# Patient Record
Sex: MALE | Race: BLACK OR AFRICAN AMERICAN | NOT HISPANIC OR LATINO | Employment: FULL TIME | URBAN - METROPOLITAN AREA
[De-identification: names, ages, dates, MRNs, and addresses within clinical notes are randomized per-mention and may not be internally consistent; named-entity substitution may affect disease eponyms.]

---

## 2017-11-09 ENCOUNTER — HOSPITAL ENCOUNTER (EMERGENCY)
Facility: HOSPITAL | Age: 42
Discharge: HOME | End: 2017-11-09
Attending: EMERGENCY MEDICINE
Payer: MEDICAID

## 2017-11-09 VITALS
BODY MASS INDEX: 44.56 KG/M2 | TEMPERATURE: 99 F | OXYGEN SATURATION: 97 % | SYSTOLIC BLOOD PRESSURE: 159 MMHG | RESPIRATION RATE: 16 BRPM | HEART RATE: 87 BPM | DIASTOLIC BLOOD PRESSURE: 100 MMHG | WEIGHT: 294 LBS | HEIGHT: 68 IN

## 2017-11-09 DIAGNOSIS — G51.0 RIGHT-SIDED BELL'S PALSY: Primary | ICD-10-CM

## 2017-11-09 PROCEDURE — 99283 EMERGENCY DEPT VISIT LOW MDM: CPT

## 2017-11-09 RX ORDER — VALACYCLOVIR HYDROCHLORIDE 1 G/1
1000 TABLET, FILM COATED ORAL 3 TIMES DAILY
Qty: 21 TABLET | Refills: 0 | Status: SHIPPED | OUTPATIENT
Start: 2017-11-09 | End: 2017-11-16

## 2017-11-09 RX ORDER — PREDNISONE 20 MG/1
60 TABLET ORAL DAILY
Qty: 15 TABLET | Refills: 0 | Status: SHIPPED | OUTPATIENT
Start: 2017-11-09 | End: 2017-11-14

## 2017-11-09 NOTE — ED PROVIDER NOTES
"Encounter Date: 11/9/2017    SCRIBE #1 NOTE: I, Key Voss , am scribing for, and in the presence of,  Dr. Alejandro . I have scribed the entire note.       History     Chief Complaint   Patient presents with    Numbness     right side of face with difficulty closing right eye and difficulty opening mouth     11/09/2017  1:02 AM     Chief Complaint: R sided facial weakness       The patient is a 41 y.o. male who is presenting with R sided facial numbness/weakness that began x 4 hours PTA. The pt reports that he was watching TV when he suddenly felt the R side of his face become "weak" when compared to the L. He endorses mild numbness to the R side his face as well. No exacerbating or mitigating factors. No other associated sx including loss of vision, extremity weakness/numbness, dysphagia, or slurred speech. No previous episodes of similar sx. PMHx includes obesity and HTN. No known past surgical hx.             The history is provided by the patient and medical records.     Review of patient's allergies indicates:   Allergen Reactions    Codeine      Past Medical History:   Diagnosis Date    Asthma     Hypertension     Obesity      History reviewed. No pertinent surgical history.  History reviewed. No pertinent family history.  Social History   Substance Use Topics    Smoking status: Never Smoker    Smokeless tobacco: Not on file    Alcohol use No     Review of Systems   Constitutional: Negative for fever.   HENT: Negative for sore throat.    Respiratory: Negative for shortness of breath.    Cardiovascular: Negative for chest pain.   Gastrointestinal: Negative for nausea.   Genitourinary: Negative for dysuria.   Musculoskeletal: Negative for back pain.   Skin: Negative for rash.   Neurological: Positive for facial asymmetry, weakness and numbness.   Hematological: Does not bruise/bleed easily.       Physical Exam     Initial Vitals [11/09/17 0031]   BP Pulse Resp Temp SpO2   (!) 196/107 95 16 98.5 °F (36.9 " °C) 99 %      MAP       136.67         Physical Exam    Nursing note and vitals reviewed.  Constitutional: He appears well-developed and well-nourished.  Non-toxic appearance. No distress.   HENT:   Head: Normocephalic and atraumatic.   Eyes: EOM are normal. Pupils are equal, round, and reactive to light.   Neck: Normal range of motion. Neck supple. No neck rigidity. No JVD present.   Cardiovascular: Normal rate, regular rhythm, normal heart sounds and intact distal pulses.   Abdominal: Soft. Bowel sounds are normal. He exhibits no distension. There is no tenderness. There is no rigidity, no rebound and no guarding.   Musculoskeletal: Normal range of motion.   Neurological: He is alert and oriented to person, place, and time. He has normal strength and normal reflexes. A cranial nerve deficit is present. No sensory deficit. He exhibits normal muscle tone. Coordination normal. GCS eye subscore is 4. GCS verbal subscore is 5. GCS motor subscore is 6.   R sided facial weakness present that includes the forehead. No slurred speech.    Skin: Skin is warm and dry.   Psychiatric: He has a normal mood and affect. His speech is normal and behavior is normal. He is not actively hallucinating.         ED Course   Procedures  Labs Reviewed - No data to display          Medical Decision Making:   History:   Old Medical Records: I decided to obtain old medical records.  Initial Assessment:   This is an emergent evaluation for left facial assymetry  My overall impression is Bell's palsy.  I do not think the patient has CVA, Lyme disease, Luis-Hunt Syndrome, acoustic neuroma, meningitis, Guillan Curlew, Basilar artery aneurysm- pt has no other cranial nerve deficits.  Decision to obtain old record and review if available.    Pt has unilateral lower and upper facial weakness that involves the forehead.  I will prescribe Lacrilube to avoid corneal damage and instruct eye patching at night. I will also prescribe oral steroids if the  symptoms have been recent in onset.    The patient express understanding of this and understands they can come back to the emergency if their condition get worse before they see their primary care doctor.   The patient discharged in NAD.     Houston Alejandro MD                Scribe Attestation:   Scribe #1: I performed the above scribed service and the documentation accurately describes the services I performed. I attest to the accuracy of the note.    I, Javon Lau, personally performed the services described in this documentation. All medical record entries made by the scribe were at my direction and in my presence.  I have reviewed the chart and agree that the record reflects my personal performance and is accurate and complete. Houston Alejandro MD.  11:42 PM 11/12/2017          ED Course      Clinical Impression:   The encounter diagnosis was Right-sided Bell's palsy.                           Houston Alejandro MD  11/12/17 8048

## 2021-01-02 ENCOUNTER — HOSPITAL ENCOUNTER (EMERGENCY)
Facility: HOSPITAL | Age: 46
Discharge: HOME | End: 2021-01-02
Attending: EMERGENCY MEDICINE
Payer: MEDICAID

## 2021-01-02 VITALS
TEMPERATURE: 100 F | DIASTOLIC BLOOD PRESSURE: 90 MMHG | WEIGHT: 280 LBS | SYSTOLIC BLOOD PRESSURE: 136 MMHG | HEART RATE: 112 BPM | OXYGEN SATURATION: 95 % | BODY MASS INDEX: 42.44 KG/M2 | RESPIRATION RATE: 20 BRPM | HEIGHT: 68 IN

## 2021-01-02 DIAGNOSIS — U07.1 COVID-19 VIRUS DETECTED: Primary | ICD-10-CM

## 2021-01-02 DIAGNOSIS — U07.1 COVID-19 VIRUS DETECTED: ICD-10-CM

## 2021-01-02 DIAGNOSIS — R05.9 COUGH: ICD-10-CM

## 2021-01-02 LAB — SARS-COV-2 RDRP RESP QL NAA+PROBE: POSITIVE

## 2021-01-02 PROCEDURE — 99283 EMERGENCY DEPT VISIT LOW MDM: CPT | Mod: 25

## 2021-01-02 PROCEDURE — U0002 COVID-19 LAB TEST NON-CDC: HCPCS

## 2021-01-04 ENCOUNTER — NURSE TRIAGE (OUTPATIENT)
Dept: ADMINISTRATIVE | Facility: CLINIC | Age: 46
End: 2021-01-04

## 2021-01-08 ENCOUNTER — NURSE TRIAGE (OUTPATIENT)
Dept: ADMINISTRATIVE | Facility: CLINIC | Age: 46
End: 2021-01-08

## 2021-12-30 ENCOUNTER — LAB VISIT (OUTPATIENT)
Dept: PRIMARY CARE CLINIC | Facility: OTHER | Age: 46
End: 2021-12-30
Attending: INTERNAL MEDICINE
Payer: MEDICAID

## 2021-12-30 DIAGNOSIS — Z20.822 ENCOUNTER FOR LABORATORY TESTING FOR COVID-19 VIRUS: ICD-10-CM

## 2021-12-30 PROCEDURE — U0003 INFECTIOUS AGENT DETECTION BY NUCLEIC ACID (DNA OR RNA); SEVERE ACUTE RESPIRATORY SYNDROME CORONAVIRUS 2 (SARS-COV-2) (CORONAVIRUS DISEASE [COVID-19]), AMPLIFIED PROBE TECHNIQUE, MAKING USE OF HIGH THROUGHPUT TECHNOLOGIES AS DESCRIBED BY CMS-2020-01-R: HCPCS | Performed by: INTERNAL MEDICINE

## 2022-01-01 LAB
SARS-COV-2 RNA RESP QL NAA+PROBE: DETECTED
SARS-COV-2- CYCLE NUMBER: 28

## 2022-01-03 DIAGNOSIS — U07.1 COVID-19 VIRUS DETECTED: ICD-10-CM

## 2022-06-19 ENCOUNTER — HOSPITAL ENCOUNTER (EMERGENCY)
Facility: HOSPITAL | Age: 47
Discharge: HOME | End: 2022-06-19
Attending: EMERGENCY MEDICINE
Payer: MEDICAID

## 2022-06-19 VITALS
OXYGEN SATURATION: 96 % | HEART RATE: 76 BPM | TEMPERATURE: 98 F | RESPIRATION RATE: 17 BRPM | HEIGHT: 68 IN | WEIGHT: 273 LBS | DIASTOLIC BLOOD PRESSURE: 76 MMHG | SYSTOLIC BLOOD PRESSURE: 135 MMHG | BODY MASS INDEX: 41.37 KG/M2

## 2022-06-19 DIAGNOSIS — T24.209A PARTIAL THICKNESS BURN OF LOWER EXTREMITY, UNSPECIFIED LATERALITY, INITIAL ENCOUNTER: Primary | ICD-10-CM

## 2022-06-19 DIAGNOSIS — T30.0 BURN: ICD-10-CM

## 2022-06-19 PROCEDURE — 96360 HYDRATION IV INFUSION INIT: CPT | Mod: 59

## 2022-06-19 PROCEDURE — 63600175 PHARM REV CODE 636 W HCPCS: Performed by: EMERGENCY MEDICINE

## 2022-06-19 PROCEDURE — 90715 TDAP VACCINE 7 YRS/> IM: CPT | Performed by: EMERGENCY MEDICINE

## 2022-06-19 PROCEDURE — 16020 DRESS/DEBRID P-THICK BURN S: CPT

## 2022-06-19 PROCEDURE — 25000003 PHARM REV CODE 250: Performed by: EMERGENCY MEDICINE

## 2022-06-19 PROCEDURE — 96361 HYDRATE IV INFUSION ADD-ON: CPT | Mod: 59

## 2022-06-19 PROCEDURE — 90471 IMMUNIZATION ADMIN: CPT | Performed by: EMERGENCY MEDICINE

## 2022-06-19 PROCEDURE — 99284 EMERGENCY DEPT VISIT MOD MDM: CPT | Mod: 25

## 2022-06-19 RX ORDER — BACITRACIN ZINC 500 [USP'U]/G
OINTMENT TOPICAL
Status: COMPLETED | OUTPATIENT
Start: 2022-06-19 | End: 2022-06-19

## 2022-06-19 RX ADMIN — SODIUM CHLORIDE 1000 ML: 0.9 INJECTION, SOLUTION INTRAVENOUS at 02:06

## 2022-06-19 RX ADMIN — TETANUS TOXOID, REDUCED DIPHTHERIA TOXOID AND ACELLULAR PERTUSSIS VACCINE, ADSORBED 0.5 ML: 5; 2.5; 8; 8; 2.5 SUSPENSION INTRAMUSCULAR at 04:06

## 2022-06-19 RX ADMIN — BACITRACIN ZINC: 500 OINTMENT TOPICAL at 03:06
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
## 2023-07-20 ENCOUNTER — PATIENT MESSAGE (OUTPATIENT)
Dept: GASTROENTEROLOGY | Facility: CLINIC | Age: 48
End: 2023-07-20
Payer: MEDICAID

## 2023-07-20 ENCOUNTER — TELEPHONE (OUTPATIENT)
Dept: GASTROENTEROLOGY | Facility: CLINIC | Age: 48
End: 2023-07-20
Payer: MEDICAID

## 2023-07-20 DIAGNOSIS — Z12.11 SCREENING FOR COLON CANCER: Primary | ICD-10-CM

## 2023-07-20 NOTE — TELEPHONE ENCOUNTER
----- Message from Jolene Esparza LPN sent at 7/20/2023  3:02 PM CDT -----    ----- Message -----  From: Jolene Carcamo  Sent: 7/20/2023   3:00 PM CDT  To: [facility] Clinical Staff      ----- Message -----  From: Rayne Banda LPN  Sent: 7/20/2023   2:55 PM CDT  To: Jolene Carcamo    Wrong dept.  This is Endocrinology.   ----- Message -----  From: Jolene Carcamo  Sent: 7/20/2023   2:53 PM CDT  To: [facility] End[facility] Clinical Staff    Hello,    Pts Gastro referral for a colon screening has been scanned into media mgr.    Thanks      Jolene PINK

## 2023-07-20 NOTE — TELEPHONE ENCOUNTER
Call placed to Jhoana Gill in regards to referral for screening colonoscopy. Pt denies any GI issues at this time. Procedure set for 10/20 at 0800 arriving for 0700. Prep instructions reviewed and placed in mail per pt request.     Address in chart corrected to new address. No further issues noted.

## 2023-09-09 ENCOUNTER — HOSPITAL ENCOUNTER (EMERGENCY)
Facility: HOSPITAL | Age: 48
Discharge: HOME | End: 2023-09-09
Attending: EMERGENCY MEDICINE
Payer: MEDICAID

## 2023-09-09 VITALS
OXYGEN SATURATION: 96 % | HEIGHT: 68 IN | SYSTOLIC BLOOD PRESSURE: 130 MMHG | HEART RATE: 78 BPM | TEMPERATURE: 98 F | RESPIRATION RATE: 18 BRPM | BODY MASS INDEX: 43.1 KG/M2 | WEIGHT: 284.38 LBS | DIASTOLIC BLOOD PRESSURE: 78 MMHG

## 2023-09-09 DIAGNOSIS — Z20.822 CLOSE EXPOSURE TO COVID-19 VIRUS: ICD-10-CM

## 2023-09-09 DIAGNOSIS — R05.9 COUGH: ICD-10-CM

## 2023-09-09 DIAGNOSIS — B34.9 VIRAL SYNDROME: Primary | ICD-10-CM

## 2023-09-09 LAB
INFLUENZA A, MOLECULAR: NEGATIVE
INFLUENZA B, MOLECULAR: NEGATIVE
SARS-COV-2 RDRP RESP QL NAA+PROBE: NEGATIVE
SPECIMEN SOURCE: NORMAL

## 2023-09-09 PROCEDURE — U0002 COVID-19 LAB TEST NON-CDC: HCPCS | Performed by: EMERGENCY MEDICINE

## 2023-09-09 PROCEDURE — 87502 INFLUENZA DNA AMP PROBE: CPT | Performed by: EMERGENCY MEDICINE

## 2023-09-09 PROCEDURE — 99283 EMERGENCY DEPT VISIT LOW MDM: CPT | Mod: 25

## 2023-09-09 RX ORDER — BENZONATATE 100 MG/1
100 CAPSULE ORAL 3 TIMES DAILY PRN
Qty: 30 CAPSULE | Refills: 0 | Status: SHIPPED | OUTPATIENT
Start: 2023-09-09 | End: 2023-09-19

## 2023-09-09 NOTE — Clinical Note
"Janae Lambert" Jairo was seen and treated in our emergency department on 9/9/2023.  He may return to work on 09/14/2023.       If you have any questions or concerns, please don't hesitate to call.      EDWINA Jose RN    "

## 2023-09-09 NOTE — ED PROVIDER NOTES
Encounter Date: 9/9/2023       History     Chief Complaint   Patient presents with    Cough     Cough, fever, upper airway congestion for the last week.    Chest Pain     Chest pain worse with cough.  Wheezing noted to upper and lower left lung fields.     HPI 47-year-old man presents emergency department for evaluation cough, fever, congestion since last week.  He has chest tightness/pain with coughing.  His wife is sick with similar symptoms and tested positive for COVID.  Review of patient's allergies indicates:   Allergen Reactions    Codeine      Past Medical History:   Diagnosis Date    Asthma     Diabetes mellitus     Hypertension     Obesity      No past surgical history on file.  No family history on file.  Social History     Tobacco Use    Smoking status: Never   Substance Use Topics    Alcohol use: No    Drug use: No     Review of Systems   Constitutional:  Positive for fever.   HENT:  Positive for congestion.    Respiratory:  Positive for cough.    Cardiovascular:  Positive for chest pain.       Physical Exam     Initial Vitals [09/09/23 0152]   BP Pulse Resp Temp SpO2   130/78 78 18 98 °F (36.7 °C) 96 %      MAP       --         Physical Exam    Nursing note and vitals reviewed.  Constitutional: He appears well-developed and well-nourished.   HENT:   Head: Normocephalic and atraumatic.   Mouth/Throat: Oropharynx is clear and moist.   Eyes: EOM are normal. Pupils are equal, round, and reactive to light.   Neck: Neck supple.   Cardiovascular:  Normal rate and regular rhythm.           Pulmonary/Chest: No respiratory distress. He has no wheezes.   Musculoskeletal:         General: Normal range of motion.      Cervical back: Neck supple.     Neurological: He is alert and oriented to person, place, and time.   Skin: Skin is warm and dry. Capillary refill takes less than 2 seconds.         ED Course   Procedures  Labs Reviewed   INFLUENZA A & B BY MOLECULAR   SARS-COV-2 RNA AMPLIFICATION, QUAL           Imaging Results              X-Ray Chest AP Portable (Preliminary result)  Result time 09/09/23 02:49:54      Wet Read by Houston Alejandro MD (09/09/23 02:49:54, [facility], Emergency Medicine)    No acute change compared to previous chest x-ray.                                     Medications - No data to display  Medical Decision Making  The patient appears to have a viral upper respiratory infection.  Wife tested positive for COVID, patient likely has COVID however test is negative.  Based upon the history and physical exam the patient does not appear to have a serious bacterial infection such as pneumonia, sepsis, otitis media, bacterial sinusitis, strep pharyngitis, parapharyngeal or peritonsillar abscess, meningitis.  Patient appears very well and I have given specific return precautions to the patient and/or family members.  The patient can take over the counter medications and does not appear to need antibiotics at this time.         Amount and/or Complexity of Data Reviewed  Radiology: ordered and independent interpretation performed.    Risk  Prescription drug management.                               Clinical Impression:   Final diagnoses:  [R05.9] Cough  [B34.9] Viral syndrome (Primary)  [Z20.822] Close exposure to COVID-19 virus        ED Disposition Condition    Discharge Stable          ED Prescriptions       Medication Sig Dispense Start Date End Date Auth. Provider    benzonatate (TESSALON) 100 MG capsule Take 1 capsule (100 mg total) by mouth 3 (three) times daily as needed for Cough. 30 capsule 9/9/2023 9/19/2023 Houston Alejandro MD          Follow-up Information       Follow up With Specialties Details Why Contact Info Additional Information    [facility] Emergency Medicine  As needed, If symptoms worsen [address] [place] [address] [address] [place] [address] 1st floor             Houston Alejandro MD  09/09/23 0691

## 2024-03-21 ENCOUNTER — HOSPITAL ENCOUNTER (EMERGENCY)
Facility: HOSPITAL | Age: 49
Discharge: HOME | End: 2024-03-21
Attending: EMERGENCY MEDICINE
Payer: MEDICAID

## 2024-03-21 VITALS
DIASTOLIC BLOOD PRESSURE: 88 MMHG | HEART RATE: 74 BPM | TEMPERATURE: 98 F | RESPIRATION RATE: 14 BRPM | WEIGHT: 240 LBS | OXYGEN SATURATION: 97 % | SYSTOLIC BLOOD PRESSURE: 148 MMHG | BODY MASS INDEX: 36.49 KG/M2

## 2024-03-21 DIAGNOSIS — S70.02XA CONTUSION OF LEFT HIP, INITIAL ENCOUNTER: ICD-10-CM

## 2024-03-21 DIAGNOSIS — R52 PAIN: ICD-10-CM

## 2024-03-21 DIAGNOSIS — S63.502A SPRAIN OF LEFT WRIST, INITIAL ENCOUNTER: Primary | ICD-10-CM

## 2024-03-21 PROCEDURE — 99284 EMERGENCY DEPT VISIT MOD MDM: CPT | Mod: 25

## 2024-03-21 PROCEDURE — 29125 APPL SHORT ARM SPLINT STATIC: CPT | Mod: LT

## 2024-03-21 RX ORDER — EMPAGLIFLOZIN 25 MG/1
25 TABLET, FILM COATED ORAL
COMMUNITY
Start: 2023-10-31

## 2024-03-21 RX ORDER — CHLORTHALIDONE 50 MG/1
50 TABLET ORAL
COMMUNITY
Start: 2023-12-07

## 2024-03-21 RX ORDER — BUDESONIDE AND FORMOTEROL FUMARATE DIHYDRATE 80; 4.5 UG/1; UG/1
AEROSOL RESPIRATORY (INHALATION)
COMMUNITY
Start: 2023-02-17

## 2024-03-21 RX ORDER — GABAPENTIN 250 MG/5ML
250 SOLUTION ORAL
COMMUNITY

## 2024-03-21 RX ORDER — ATORVASTATIN CALCIUM 80 MG/1
1 TABLET, FILM COATED ORAL DAILY
COMMUNITY
Start: 2023-11-07 | End: 2024-11-06

## 2024-03-21 RX ORDER — AMLODIPINE BESYLATE 10 MG/1
1 TABLET ORAL DAILY
COMMUNITY

## 2024-03-22 NOTE — ED PROVIDER NOTES
Encounter Date: 3/21/2024       History     Chief Complaint   Patient presents with    Fall     Slipped and fell on wet floor at gas station.  Having left wrist- left hip pain     Patient is a 48 y.o. male who presents to the ED 03/21/2024 with a chief complaint of Left wrist pain and numbness in his fingers and left hip pain after falling at the gas station at 2:00 a.m..  Patient states he slipped on a wet floor and landed on his left side.  He states he did not hit his head or lose consciousness.  He states his pain to his left hip is mild and feels sore.  He has been able to walk.  He states his wrist is bothering him more.  He states he went to the tent at work and they gave him some Tylenol and denies pack.  States he noticed some mild swelling to the dorsum of his left hand near the wrist.  He states he continues to have intermittent numbness in his 3 middle fingers and wanted to get checked out.             Review of patient's allergies indicates:   Allergen Reactions    Codeine      Past Medical History:   Diagnosis Date    Asthma     Diabetes mellitus     Hypertension     Obesity      No past surgical history on file.  No family history on file.  Social History     Tobacco Use    Smoking status: Never   Substance Use Topics    Alcohol use: No    Drug use: No     Review of Systems   Constitutional:  Negative for chills and fever.   HENT:  Negative for sore throat.    Respiratory:  Negative for chest tightness and shortness of breath.    Cardiovascular:  Negative for chest pain.   Gastrointestinal:  Negative for abdominal pain.   Genitourinary:  Negative for dysuria.   Musculoskeletal:  Positive for arthralgias. Negative for back pain, gait problem, joint swelling, myalgias, neck pain and neck stiffness.   Skin:  Negative for rash and wound.   Neurological:  Positive for numbness. Negative for syncope and weakness.   Hematological:  Does not bruise/bleed easily.       Physical Exam     Initial Vitals  [03/21/24 1117]   BP Pulse Resp Temp SpO2   (!) 153/88 76 14 98.1 °F (36.7 °C) 97 %      MAP       --         Physical Exam    Nursing note and vitals reviewed.  Constitutional: He appears well-developed and well-nourished.   HENT:   Head: Normocephalic and atraumatic.   Eyes: Conjunctivae are normal. Pupils are equal, round, and reactive to light. Right eye exhibits no discharge. Left eye exhibits no discharge.   Neck: Neck supple.   Normal range of motion.   Full passive range of motion without pain.     Cardiovascular:  Normal rate, regular rhythm, normal heart sounds and intact distal pulses.           Pulses:       Radial pulses are 2+ on the left side.        Dorsalis pedis pulses are 2+ on the left side.        Posterior tibial pulses are 2+ on the left side.   Pulmonary/Chest: Breath sounds normal.   Abdominal: Abdomen is soft. Bowel sounds are normal.   Musculoskeletal:         General: Normal range of motion.      Left wrist: Tenderness present. No swelling, deformity, effusion, lacerations, bony tenderness, snuff box tenderness or crepitus. Normal range of motion. Normal pulse.      Left hand: Tenderness present. No swelling, deformity, lacerations or bony tenderness. Normal range of motion. Normal strength. Normal sensation. There is no disruption of two-point discrimination. Normal capillary refill. Normal pulse.      Cervical back: Full passive range of motion without pain, normal range of motion and neck supple. No rigidity. No spinous process tenderness or muscular tenderness.      Right upper leg: No swelling, edema, deformity, lacerations, tenderness or bony tenderness.      Left upper leg: Tenderness present. No swelling, edema, deformity, lacerations or bony tenderness.      Right knee: Normal.      Left knee: Normal.      Right ankle: Normal.      Left ankle: Normal.      Comments: Left lateral hip tenderness. No swelling or skin changes.      Neurological: He is alert and oriented to person,  place, and time. He has normal strength. No sensory deficit.   No focal neurological deficits noted. Cranial nerves III-XII grossly intact. Equal rapid alternating movements noted.   5/5 strength to bilateral upper extremities.  Slight altered sensation to light touch in the palmar surface of the left hand over the median nerve distribution.   Skin: Skin is warm and dry. Capillary refill takes less than 2 seconds.   Psychiatric: He has a normal mood and affect.         ED Course   Procedures  Labs Reviewed - No data to display       Imaging Results              X-Ray Hip 2 or 3 views Left (with Pelvis when performed) (Final result)  Result time 03/21/24 13:17:58      Final result by Karen Cabral MD (03/21/24 13:17:58)                   Impression:      No acute fracture or dislocation.      Electronically signed by: Karen Cabral MD  Date:    03/21/2024  Time:    13:17               Narrative:    EXAMINATION:  XR HIP WITH PELVIS WHEN PERFORMED, 2 OR 3 VIEWS LEFT    CLINICAL HISTORY:  Pain, unspecified    TECHNIQUE:  AP view of the pelvis and frog leg lateral view of the left hip were performed.    COMPARISON:  None    FINDINGS:  There is no acute fracture or dislocation demonstrated.  The bony pelvic ring appears intact.  The heights of the hip joints appear maintained.  Small calcific density projecting in the soft tissues at the scrotal level.                                       X-Ray Wrist Complete Left (Final result)  Result time 03/21/24 13:16:21      Final result by Karen Cabral MD (03/21/24 13:16:21)                   Impression:      As above      Electronically signed by: Karen Cabral MD  Date:    03/21/2024  Time:    13:16               Narrative:    EXAMINATION:  XR WRIST COMPLETE 3 VIEWS LEFT    CLINICAL HISTORY:  Pain, unspecified    TECHNIQUE:  PA, lateral, and oblique views of the left wrist were performed.    COMPARISON:  None    FINDINGS:  There is no acute fracture or  dislocation.  Degenerative changes appear mild.                                       X-Ray Hand 3 View Left (Final result)  Result time 03/21/24 13:16:58      Final result by Karen Cabral MD (03/21/24 13:16:58)                   Impression:      As above      Electronically signed by: Karen Cabral MD  Date:    03/21/2024  Time:    13:16               Narrative:    EXAMINATION:  XR HAND COMPLETE 3 VIEW LEFT    CLINICAL HISTORY:  pain;.    TECHNIQUE:  PA, lateral, and oblique views of the left hand were performed.    COMPARISON:  None    FINDINGS:  There is no acute fracture or dislocate degenerative changes appear mild.                                       Medications - No data to display  Medical Decision Making  Amount and/or Complexity of Data Reviewed  Radiology: ordered.         APC / Resident Notes:   Patient is a 48 y.o. male who presents to the ED 03/21/2024 who left hip and left wrist pain after fall that occurred last night.  He did not hit his head or lose consciousness.  He denies any neck pain.  I do not think any emergent head or cervical spine imaging indicated at this time.  Patient ambulatory.  He denies any other back pain.  He complains of pain in the left wrist.  He has a median nerve distribution intermittent numbness was slightly altered sensation to light touch that is coming and going.  Discussed possible median nerve injury versus inflammation or entrapment.  X-ray of left wrist and hand without acute findings.  I do not think any acute fracture dislocation.  He is placed in left wrist Velcro splint.  Skin intact.  Pulse the left upper extremity with no signs of distal neurovascular compromise pre or post splint placement.  He is given referral to Orthopedics for this.  Patient also complains of left lateral hip pain.  Likely contusion.  He is normal active range of motion of the left hip and is ambulatory.  X-ray of left hip without acute findings.  I do not think any acute  fracture or dislocation.  He has given no for work per his request.  Based on my clinical evaluation, I do not appreciate any immediate, emergent, or life threatening condition or etiology that warrants additional workup today and feel that the patient can be discharged with close follow up care. Case discussed with Dr. Villasenor who is agreeable to plan of care. Follow up and return precautions discussed; patient verbalized understanding and is agreeable to plan of care. Patient discharged home in stable condition.                           Medical Decision Making:   Differential Diagnosis:   Carpal tunnel  Fracture  contusion             Clinical Impression:  Final diagnoses:  [R52] Pain  [S63.502A] Sprain of left wrist, initial encounter (Primary)  [S70.02XA] Contusion of left hip, initial encounter          ED Disposition Condition    Discharge Stable          ED Prescriptions    None       Follow-up Information       Follow up With Specialties Details Why Contact Info Additional Information    Juany Malik MD Family Medicine In 3 days  [address] [facility] [address]  [phone]       [facility] Emergency Medicine  As needed, If symptoms worsen [address] [place] [address] Dr Roderick Hurst [phone] 1st floor    Orthopedics, [facility] - Orthopedic Surgery, Orthopedic Surgery In 3 days  [address] [facility] [id number] [phone]       [facility] Emergency Medicine  As needed, If symptoms worsen [address] [place] [address] Dr Roderick Hurst [phone] 1st floor             Silvina Finney NP  03/21/24 2016

## 2025-04-16 ENCOUNTER — HOSPITAL ENCOUNTER (INPATIENT)
Facility: HOSPITAL | Age: 50
LOS: 3 days | Discharge: HOME | DRG: 322 | End: 2025-04-19
Attending: EMERGENCY MEDICINE | Admitting: STUDENT IN AN ORGANIZED HEALTH CARE EDUCATION/TRAINING PROGRAM
Payer: MEDICAID

## 2025-04-16 DIAGNOSIS — I21.4 NSTEMI (NON-ST ELEVATION MYOCARDIAL INFARCTION): ICD-10-CM

## 2025-04-16 DIAGNOSIS — I21.4 NSTEMI (NON-ST ELEVATED MYOCARDIAL INFARCTION): Primary | ICD-10-CM

## 2025-04-16 DIAGNOSIS — I21.4 NON-ST ELEVATION MYOCARDIAL INFARCTION (NSTEMI): ICD-10-CM

## 2025-04-16 DIAGNOSIS — I25.10 CAD (CORONARY ARTERY DISEASE): ICD-10-CM

## 2025-04-16 DIAGNOSIS — R07.9 CHEST PAIN: ICD-10-CM

## 2025-04-16 PROBLEM — I10 HYPERTENSION: Status: ACTIVE | Noted: 2025-04-16

## 2025-04-16 PROBLEM — N17.9 ACUTE ON CHRONIC RENAL FAILURE: Status: RESOLVED | Noted: 2025-04-16 | Resolved: 2025-04-16

## 2025-04-16 PROBLEM — E11.9 TYPE 2 DIABETES MELLITUS: Status: ACTIVE | Noted: 2025-04-16

## 2025-04-16 PROBLEM — N18.9 ACUTE ON CHRONIC RENAL FAILURE: Status: RESOLVED | Noted: 2025-04-16 | Resolved: 2025-04-16

## 2025-04-16 PROBLEM — Z98.84 HISTORY OF GASTRIC BYPASS: Status: ACTIVE | Noted: 2025-04-16

## 2025-04-16 PROBLEM — N17.9 ACUTE ON CHRONIC RENAL FAILURE: Status: ACTIVE | Noted: 2025-04-16

## 2025-04-16 PROBLEM — N18.9 ACUTE ON CHRONIC RENAL FAILURE: Status: ACTIVE | Noted: 2025-04-16

## 2025-04-16 LAB
ABSOLUTE EOSINOPHIL (SMH): 0.22 K/UL
ABSOLUTE MONOCYTE (SMH): 0.47 K/UL (ref 0.3–1)
ABSOLUTE NEUTROPHIL COUNT (SMH): 3.4 K/UL (ref 1.8–7.7)
ALBUMIN SERPL-MCNC: 3.7 G/DL (ref 3.5–5.2)
ALP SERPL-CCNC: 100 UNIT/L (ref 40–150)
ALT SERPL-CCNC: 13 UNIT/L (ref 10–44)
ANION GAP (SMH): 7 MMOL/L (ref 8–16)
APTT PPP: 23.9 SECONDS (ref 21–32)
AST SERPL-CCNC: 27 UNIT/L (ref 11–45)
BASOPHILS # BLD AUTO: 0.03 K/UL
BASOPHILS NFR BLD AUTO: 0.4 %
BILIRUB SERPL-MCNC: 0.3 MG/DL (ref 0.1–1)
BUN SERPL-MCNC: 14 MG/DL (ref 6–20)
CALCIUM SERPL-MCNC: 8.7 MG/DL (ref 8.7–10.5)
CHLORIDE SERPL-SCNC: 107 MMOL/L (ref 95–110)
CHOLEST SERPL-MCNC: 172 MG/DL (ref 120–199)
CHOLEST/HDLC SERPL: 4.2 {RATIO} (ref 2–5)
CO2 SERPL-SCNC: 25 MMOL/L (ref 23–29)
CREAT SERPL-MCNC: 1.4 MG/DL (ref 0.5–1.4)
ERYTHROCYTE [DISTWIDTH] IN BLOOD BY AUTOMATED COUNT: 12.1 % (ref 11.5–14.5)
GFR SERPLBLD CREATININE-BSD FMLA CKD-EPI: >60 ML/MIN/1.73/M2
GLUCOSE SERPL-MCNC: 169 MG/DL (ref 70–110)
HCT VFR BLD AUTO: 40 % (ref 40–54)
HCV AB SERPL QL IA: NORMAL
HDLC SERPL-MCNC: 41 MG/DL (ref 40–75)
HDLC SERPL: 23.8 % (ref 20–50)
HGB BLD-MCNC: 13.2 GM/DL (ref 14–18)
HIV 1+2 AB+HIV1 P24 AG SERPL QL IA: NORMAL
IMM GRANULOCYTES # BLD AUTO: 0.01 K/UL (ref 0–0.04)
IMM GRANULOCYTES NFR BLD AUTO: 0.1 % (ref 0–0.5)
INR PPP: 1 (ref 0.8–1.2)
LDLC SERPL CALC-MCNC: 121 MG/DL (ref 63–159)
LYMPHOCYTES # BLD AUTO: 2.79 K/UL (ref 1–4.8)
MAGNESIUM SERPL-MCNC: 1.9 MG/DL (ref 1.6–2.6)
MCH RBC QN AUTO: 30.2 PG (ref 27–31)
MCHC RBC AUTO-ENTMCNC: 33 G/DL (ref 32–36)
MCV RBC AUTO: 92 FL (ref 82–98)
NONHDLC SERPL-MCNC: 131 MG/DL
NT-PROBNP SERPL-MCNC: 769 PG/ML
NUCLEATED RBC (/100WBC) (SMH): 0 /100 WBC
PLATELET # BLD AUTO: 214 K/UL (ref 150–450)
PMV BLD AUTO: 10.2 FL (ref 9.2–12.9)
POTASSIUM SERPL-SCNC: 4.6 MMOL/L (ref 3.5–5.1)
PROT SERPL-MCNC: 6.9 GM/DL (ref 6–8.4)
PROTHROMBIN TIME: 11.2 SECONDS (ref 9–12.5)
RBC # BLD AUTO: 4.37 M/UL (ref 4.6–6.2)
RELATIVE EOSINOPHIL (SMH): 3.2 % (ref 0–8)
RELATIVE LYMPHOCYTE (SMH): 40.2 % (ref 18–48)
RELATIVE MONOCYTE (SMH): 6.8 % (ref 4–15)
RELATIVE NEUTROPHIL (SMH): 49.3 % (ref 38–73)
SODIUM SERPL-SCNC: 139 MMOL/L (ref 136–145)
TRIGL SERPL-MCNC: 50 MG/DL (ref 30–150)
TROPONIN HIGH SENSITIVE (SMH): 2256.8 PG/ML
TROPONIN I SERPL HS-MCNC: 1802 NG/L
TROPONIN I SERPL HS-MCNC: 482 NG/L
WBC # BLD AUTO: 6.94 K/UL (ref 3.9–12.7)

## 2025-04-16 PROCEDURE — 36415 COLL VENOUS BLD VENIPUNCTURE: CPT | Performed by: EMERGENCY MEDICINE

## 2025-04-16 PROCEDURE — 83880 ASSAY OF NATRIURETIC PEPTIDE: CPT | Performed by: EMERGENCY MEDICINE

## 2025-04-16 PROCEDURE — 25000003 PHARM REV CODE 250: Performed by: EMERGENCY MEDICINE

## 2025-04-16 PROCEDURE — 83735 ASSAY OF MAGNESIUM: CPT | Performed by: EMERGENCY MEDICINE

## 2025-04-16 PROCEDURE — 84484 ASSAY OF TROPONIN QUANT: CPT | Performed by: EMERGENCY MEDICINE

## 2025-04-16 PROCEDURE — 93005 ELECTROCARDIOGRAM TRACING: CPT

## 2025-04-16 PROCEDURE — 87389 HIV-1 AG W/HIV-1&-2 AB AG IA: CPT | Performed by: EMERGENCY MEDICINE

## 2025-04-16 PROCEDURE — 85025 COMPLETE CBC W/AUTO DIFF WBC: CPT | Performed by: EMERGENCY MEDICINE

## 2025-04-16 PROCEDURE — 82465 ASSAY BLD/SERUM CHOLESTEROL: CPT | Performed by: STUDENT IN AN ORGANIZED HEALTH CARE EDUCATION/TRAINING PROGRAM

## 2025-04-16 PROCEDURE — 80053 COMPREHEN METABOLIC PANEL: CPT | Performed by: EMERGENCY MEDICINE

## 2025-04-16 PROCEDURE — 85610 PROTHROMBIN TIME: CPT | Performed by: EMERGENCY MEDICINE

## 2025-04-16 PROCEDURE — 84484 ASSAY OF TROPONIN QUANT: CPT | Performed by: STUDENT IN AN ORGANIZED HEALTH CARE EDUCATION/TRAINING PROGRAM

## 2025-04-16 PROCEDURE — 36415 COLL VENOUS BLD VENIPUNCTURE: CPT | Performed by: STUDENT IN AN ORGANIZED HEALTH CARE EDUCATION/TRAINING PROGRAM

## 2025-04-16 PROCEDURE — 86850 RBC ANTIBODY SCREEN: CPT | Performed by: STUDENT IN AN ORGANIZED HEALTH CARE EDUCATION/TRAINING PROGRAM

## 2025-04-16 PROCEDURE — 83036 HEMOGLOBIN GLYCOSYLATED A1C: CPT | Performed by: STUDENT IN AN ORGANIZED HEALTH CARE EDUCATION/TRAINING PROGRAM

## 2025-04-16 PROCEDURE — 93010 ELECTROCARDIOGRAM REPORT: CPT | Mod: ,,, | Performed by: GENERAL PRACTICE

## 2025-04-16 PROCEDURE — 85730 THROMBOPLASTIN TIME PARTIAL: CPT | Performed by: EMERGENCY MEDICINE

## 2025-04-16 PROCEDURE — 21000000 HC CCU ICU ROOM CHARGE

## 2025-04-16 PROCEDURE — 86803 HEPATITIS C AB TEST: CPT | Performed by: EMERGENCY MEDICINE

## 2025-04-16 PROCEDURE — 25500020 PHARM REV CODE 255

## 2025-04-16 PROCEDURE — 63600175 PHARM REV CODE 636 W HCPCS: Performed by: EMERGENCY MEDICINE

## 2025-04-16 PROCEDURE — 25000242 PHARM REV CODE 250 ALT 637 W/ HCPCS: Performed by: EMERGENCY MEDICINE

## 2025-04-16 RX ORDER — CHOLECALCIFEROL (VITAMIN D3) 10 MCG
1 TABLET ORAL DAILY
COMMUNITY

## 2025-04-16 RX ORDER — ONDANSETRON HYDROCHLORIDE 2 MG/ML
4 INJECTION, SOLUTION INTRAVENOUS EVERY 12 HOURS PRN
Status: DISCONTINUED | OUTPATIENT
Start: 2025-04-16 | End: 2025-04-19 | Stop reason: HOSPADM

## 2025-04-16 RX ORDER — MUPIROCIN 20 MG/G
OINTMENT TOPICAL 2 TIMES DAILY
Status: DISCONTINUED | OUTPATIENT
Start: 2025-04-17 | End: 2025-04-19 | Stop reason: HOSPADM

## 2025-04-16 RX ORDER — LABETALOL HYDROCHLORIDE 5 MG/ML
10 INJECTION, SOLUTION INTRAVENOUS EVERY 6 HOURS PRN
Status: DISCONTINUED | OUTPATIENT
Start: 2025-04-16 | End: 2025-04-19 | Stop reason: HOSPADM

## 2025-04-16 RX ORDER — ASPIRIN 325 MG
325 TABLET ORAL
Status: COMPLETED | OUTPATIENT
Start: 2025-04-16 | End: 2025-04-16

## 2025-04-16 RX ORDER — MORPHINE SULFATE 2 MG/ML
2 INJECTION, SOLUTION INTRAMUSCULAR; INTRAVENOUS EVERY 4 HOURS PRN
Refills: 0 | Status: DISCONTINUED | OUTPATIENT
Start: 2025-04-16 | End: 2025-04-19 | Stop reason: HOSPADM

## 2025-04-16 RX ORDER — TADALAFIL 20 MG/1
20 TABLET, FILM COATED ORAL DAILY PRN
COMMUNITY

## 2025-04-16 RX ORDER — INSULIN ASPART 100 [IU]/ML
0-5 INJECTION, SOLUTION INTRAVENOUS; SUBCUTANEOUS
Status: DISCONTINUED | OUTPATIENT
Start: 2025-04-16 | End: 2025-04-19 | Stop reason: HOSPADM

## 2025-04-16 RX ORDER — IBUPROFEN 200 MG
24 TABLET ORAL
Status: DISCONTINUED | OUTPATIENT
Start: 2025-04-16 | End: 2025-04-19 | Stop reason: HOSPADM

## 2025-04-16 RX ORDER — IPRATROPIUM BROMIDE AND ALBUTEROL SULFATE 2.5; .5 MG/3ML; MG/3ML
3 SOLUTION RESPIRATORY (INHALATION) EVERY 6 HOURS PRN
Status: DISCONTINUED | OUTPATIENT
Start: 2025-04-16 | End: 2025-04-19 | Stop reason: HOSPADM

## 2025-04-16 RX ORDER — NITROGLYCERIN 0.4 MG/1
0.4 TABLET SUBLINGUAL
Status: COMPLETED | OUTPATIENT
Start: 2025-04-16 | End: 2025-04-16

## 2025-04-16 RX ORDER — TALC
6 POWDER (GRAM) TOPICAL NIGHTLY PRN
Status: DISCONTINUED | OUTPATIENT
Start: 2025-04-16 | End: 2025-04-19 | Stop reason: HOSPADM

## 2025-04-16 RX ORDER — ATORVASTATIN CALCIUM 40 MG/1
80 TABLET, FILM COATED ORAL DAILY
Status: DISCONTINUED | OUTPATIENT
Start: 2025-04-17 | End: 2025-04-19 | Stop reason: HOSPADM

## 2025-04-16 RX ORDER — NITROGLYCERIN 0.4 MG/1
0.4 TABLET SUBLINGUAL EVERY 5 MIN PRN
Status: DISCONTINUED | OUTPATIENT
Start: 2025-04-16 | End: 2025-04-19 | Stop reason: HOSPADM

## 2025-04-16 RX ORDER — LANOLIN ALCOHOL/MO/W.PET/CERES
800 CREAM (GRAM) TOPICAL
Status: DISCONTINUED | OUTPATIENT
Start: 2025-04-16 | End: 2025-04-19 | Stop reason: HOSPADM

## 2025-04-16 RX ORDER — AMOXICILLIN 250 MG
1 CAPSULE ORAL 2 TIMES DAILY PRN
Status: DISCONTINUED | OUTPATIENT
Start: 2025-04-16 | End: 2025-04-19 | Stop reason: HOSPADM

## 2025-04-16 RX ORDER — IBUPROFEN 200 MG
16 TABLET ORAL
Status: DISCONTINUED | OUTPATIENT
Start: 2025-04-16 | End: 2025-04-19 | Stop reason: HOSPADM

## 2025-04-16 RX ORDER — NALOXONE HCL 0.4 MG/ML
0.02 VIAL (ML) INJECTION
Status: DISCONTINUED | OUTPATIENT
Start: 2025-04-16 | End: 2025-04-19 | Stop reason: HOSPADM

## 2025-04-16 RX ORDER — HEPARIN SODIUM,PORCINE/D5W 25000/250
0-40 INTRAVENOUS SOLUTION INTRAVENOUS CONTINUOUS
Status: DISCONTINUED | OUTPATIENT
Start: 2025-04-16 | End: 2025-04-18

## 2025-04-16 RX ORDER — GLUCAGON 1 MG
1 KIT INJECTION
Status: DISCONTINUED | OUTPATIENT
Start: 2025-04-16 | End: 2025-04-19 | Stop reason: HOSPADM

## 2025-04-16 RX ORDER — SODIUM,POTASSIUM PHOSPHATES 280-250MG
2 POWDER IN PACKET (EA) ORAL
Status: DISCONTINUED | OUTPATIENT
Start: 2025-04-16 | End: 2025-04-19 | Stop reason: HOSPADM

## 2025-04-16 RX ORDER — ACETAMINOPHEN 325 MG/1
650 TABLET ORAL EVERY 4 HOURS PRN
Status: DISCONTINUED | OUTPATIENT
Start: 2025-04-16 | End: 2025-04-19 | Stop reason: HOSPADM

## 2025-04-16 RX ORDER — NAPROXEN SODIUM 220 MG/1
81 TABLET, FILM COATED ORAL DAILY
Status: DISCONTINUED | OUTPATIENT
Start: 2025-04-17 | End: 2025-04-19 | Stop reason: HOSPADM

## 2025-04-16 RX ORDER — SODIUM CHLORIDE 0.9 % (FLUSH) 0.9 %
10 SYRINGE (ML) INJECTION
Status: DISCONTINUED | OUTPATIENT
Start: 2025-04-16 | End: 2025-04-19 | Stop reason: HOSPADM

## 2025-04-16 RX ADMIN — HEPARIN SODIUM 12 UNITS/KG/HR: 10000 INJECTION, SOLUTION INTRAVENOUS at 07:04

## 2025-04-16 RX ADMIN — ASPIRIN 325 MG: 325 TABLET ORAL at 04:04

## 2025-04-16 RX ADMIN — NITROGLYCERIN 1 INCH: 20 OINTMENT TOPICAL at 06:04

## 2025-04-16 RX ADMIN — NITROGLYCERIN 0.4 MG: 0.4 TABLET SUBLINGUAL at 04:04

## 2025-04-16 RX ADMIN — IOHEXOL 75 ML: 350 INJECTION, SOLUTION INTRAVENOUS at 06:04

## 2025-04-16 RX ADMIN — NITROGLYCERIN 0.4 MG: 0.4 TABLET SUBLINGUAL at 06:04

## 2025-04-16 NOTE — Clinical Note
The wire was removed from the distal left anterior descending arteryPulled back into guide catheter.

## 2025-04-16 NOTE — ED PROVIDER NOTES
Encounter Date: 4/16/2025       History     Chief Complaint   Patient presents with    Chest Pain     Chest pain that comes and goes since Friday stated his pains today stopped him from being able to continue his work      49-year-old male with previous history of obesity, gastric bypass, type 2 diabetes, hypertension, hyperlipidemia.  Family history of coronary artery disease before 50 on mother's side.  Patient presents emergency department with intermittent midsternal chest pain with radiation to the right jaw over last 4-5 days.  Patient states worse with exertion.  He is associated with shortness of breath.  Denies nausea, vomiting, no diaphoresis.  Currently at this time patient states pain is 6/10.  Does radiate between his shoulder blades to his back as well.  Patient with no previous cardiac workup.  EKG with normal sinus rhythm, normal axis, nonspecific T-wave changes.  No acute ST segment elevation or depression noted.  Patient did not take any medications prior to arrival.      Review of patient's allergies indicates:   Allergen Reactions    Codeine      Past Medical History:   Diagnosis Date    Asthma     Diabetes mellitus     Hypertension     Obesity      History reviewed. No pertinent surgical history.  No family history on file.  Social History[1]  Review of Systems   Constitutional:  Negative for fever.   HENT:  Negative for sore throat.    Respiratory:  Negative for shortness of breath and wheezing.    Cardiovascular:  Positive for chest pain. Negative for palpitations and leg swelling.   Gastrointestinal:  Positive for nausea. Negative for abdominal pain and vomiting.   Genitourinary:  Negative for dysuria.   Musculoskeletal:  Negative for back pain.   Skin:  Negative for rash.   Neurological:  Negative for weakness and light-headedness.   Hematological:  Does not bruise/bleed easily.       Physical Exam     Initial Vitals   BP Pulse Resp Temp SpO2   04/16/25 1546 04/16/25 1546 04/16/25 1546  04/16/25 1548 04/16/25 1546   (!) 182/101 87 17 98.1 °F (36.7 °C) 97 %      MAP       --                Physical Exam    Nursing note and vitals reviewed.  Constitutional: He appears well-developed and well-nourished.   HENT:   Head: Normocephalic and atraumatic.   Nose: Nose normal. Mouth/Throat: Oropharynx is clear and moist.   Eyes: Conjunctivae and EOM are normal. Pupils are equal, round, and reactive to light. No scleral icterus.   Neck: Neck supple.   Normal range of motion.  Cardiovascular:  Normal rate, regular rhythm, normal heart sounds and intact distal pulses.     Exam reveals no gallop and no friction rub.       No murmur heard.  Pulmonary/Chest: No stridor. No respiratory distress.   Course bilateral breath sounds no adventitious sounds   Abdominal: Abdomen is soft. Bowel sounds are normal. He exhibits no mass. There is no abdominal tenderness. There is no rebound and no guarding.   Musculoskeletal:         General: No edema. Normal range of motion.      Cervical back: Normal range of motion and neck supple.     Lymphadenopathy:     He has no cervical adenopathy.   Neurological: He is alert and oriented to person, place, and time. He has normal strength and normal reflexes. No cranial nerve deficit or sensory deficit. GCS score is 15. GCS eye subscore is 4. GCS verbal subscore is 5. GCS motor subscore is 6.   Skin: Skin is warm and dry. Capillary refill takes less than 2 seconds. No rash noted.   Psychiatric: He has a normal mood and affect. His behavior is normal. Judgment and thought content normal.         ED Course   Procedures  Labs Reviewed   COMPREHENSIVE METABOLIC PANEL - Abnormal       Result Value    Sodium 139      Potassium 4.6      Chloride 107      CO2 25      Glucose 169 (*)     BUN 14      Creatinine 1.4      Calcium 8.7      Protein Total 6.9      Albumin 3.7      Bilirubin Total 0.3            AST 27      ALT 13      Anion Gap 7 (*)     eGFR >60     TROPONIN I HIGH SENSITIVITY  - Abnormal    Troponin High Sensitive 482 (*)    NT-PRO NATRIURETIC PEPTIDE - Abnormal    NT-proBNP 769 (*)    CBC WITH DIFFERENTIAL - Abnormal    WBC 6.94      RBC 4.37 (*)     Hgb 13.2 (*)     Hct 40.0      MCV 92      MCH 30.2      MCHC 33.0      RDW 12.1      Platelet Count 214      MPV 10.2      Nucleated RBC 0      Neut % 49.3      Lymph % 40.2      Mono % 6.8      Eos % 3.2      Basophil % 0.4      Imm Grans % 0.1      Neut # 3.4      Lymph # 2.79      Mono # 0.47      Eos # 0.22      Baso # 0.03      Imm Grans # 0.01     MAGNESIUM - Normal    Magnesium 1.9     HEPATITIS C ANTIBODY - Normal    Hep C Ab Interp Non-Reactive     HIV 1 / 2 ANTIBODY - Normal    HIV 1/2 Ag/Ab Non-Reactive     APTT - Normal    PTT 23.9     PROTIME-INR - Normal    PT 11.2      INR 1.0     CBC W/ AUTO DIFFERENTIAL    Narrative:     The following orders were created for panel order CBC auto differential.  Procedure                               Abnormality         Status                     ---------                               -----------         ------                     CBC with Differential[[id number]]       Abnormal            Final result                 Please view results for these tests on the individual orders.          Imaging Results              CTA Chest Non-Coronary (PE Studies) (Final result)  Result time 04/16/25 18:15:31      Final result by Silvio Quiros DO (04/16/25 18:15:31)                   Impression:      No pulmonary embolism to the segmental level.      Electronically signed by: Silvio Quiros  Date:    04/16/2025  Time:    18:15               Narrative:    EXAMINATION:  CTA CHEST NON CORONARY (PE STUDIES)    CLINICAL HISTORY:  Pulmonary embolism (PE) suspected, high prob;    TECHNIQUE:  Low dose axial images, sagittal and coronal reformations were obtained from the thoracic inlet to the lung bases following the IV administration of 75 mL of Omnipaque 350.  Contrast timing was optimized to evaluate the  pulmonary arteries.  Maximum intensity projection images were provided for review.    COMPARISON:  None    FINDINGS:  Pulmonary vasculature: Satisfactory opacification of the pulmonary arterial system with no filling defect to the segmental level.    Aorta: Mild atherosclerosis noted.  No aneurysm.    Base of Neck: No significant abnormality.    Thoracic soft tissues: Normal.    Heart: Normal size. No effusion.    Lashae/Mediastinum: No pathologic karie enlargement.    Airways: The large airways are patent. No foci of endobronchial filling.    Lungs/Pleura: Clear lungs. No pleural effusion or thickening.    Esophagus: Normal.    Upper Abdomen: Postop changes of sleeve gastrectomy noted.    Bones: No acute fracture. No suspicious lytic or sclerotic lesions.                                       X-Ray Chest AP Portable (Final result)  Result time 04/16/25 17:04:11      Final result by Silvio Quiros DO (04/16/25 17:04:11)                   Impression:      No acute abnormality.      Electronically signed by: Silvio Quiros  Date:    04/16/2025  Time:    17:04               Narrative:    EXAMINATION:  XR CHEST AP PORTABLE    CLINICAL HISTORY:  Chest Pain;    TECHNIQUE:  Single frontal view of the chest was performed.    COMPARISON:  09/09/2023.    FINDINGS:  The lungs are well expanded and clear. No focal opacities are seen. The pleural spaces are clear. The cardiac silhouette is borderline enlarged.  The visualized osseous structures are unremarkable.                                       Medications   heparin 25,000 units in dextrose 5% 250 mL (100 units/mL) infusion LOW INTENSITY nomogram - [facility] (12 Units/kg/hr × 89.4 kg (Adjusted) Intravenous New Bag 4/16/25 1924)   heparin 25,000 units in dextrose 5% (100 units/ml) IV bolus from bag LOW INTENSITY nomogram - [facility] (has no administration in time range)   heparin 25,000 units in dextrose 5% (100 units/ml) IV bolus from bag LOW INTENSITY nomogram - [facility] (has no  administration in time range)   aspirin chewable tablet 81 mg (has no administration in time range)   atorvastatin tablet 80 mg (has no administration in time range)   nitroGLYCERIN SL tablet 0.4 mg (has no administration in time range)   ondansetron injection 4 mg (has no administration in time range)   melatonin tablet 6 mg (has no administration in time range)   acetaminophen tablet 650 mg (has no administration in time range)   morphine injection 2 mg (has no administration in time range)   sodium chloride 0.9% flush 10 mL (has no administration in time range)   albuterol-ipratropium 2.5 mg-0.5 mg/3 mL nebulizer solution 3 mL (has no administration in time range)   senna-docusate 8.6-50 mg per tablet 1 tablet (has no administration in time range)   naloxone 0.4 mg/mL injection 0.02 mg (has no administration in time range)   potassium bicarbonate disintegrating tablet 50 mEq (has no administration in time range)   potassium bicarbonate disintegrating tablet 35 mEq (has no administration in time range)   potassium bicarbonate disintegrating tablet 60 mEq (has no administration in time range)   magnesium oxide tablet 800 mg (has no administration in time range)   magnesium oxide tablet 800 mg (has no administration in time range)   potassium, sodium phosphates 280-160-250 mg packet 2 packet (has no administration in time range)   potassium, sodium phosphates 280-160-250 mg packet 2 packet (has no administration in time range)   potassium, sodium phosphates 280-160-250 mg packet 2 packet (has no administration in time range)   glucose chewable tablet 16 g (has no administration in time range)   glucose chewable tablet 24 g (has no administration in time range)   dextrose 50% injection 12.5 g (has no administration in time range)   dextrose 50% injection 25 g (has no administration in time range)   glucagon (human recombinant) injection 1 mg (has no administration in time range)   labetaloL injection 10 mg (has no  administration in time range)   insulin aspart U-100 pen 0-5 Units (has no administration in time range)   mupirocin 2 % ointment (has no administration in time range)   aspirin tablet 325 mg (325 mg Oral Given 4/16/25 1610)   nitroGLYCERIN SL tablet 0.4 mg (0.4 mg Sublingual Given 4/16/25 1611)   iohexoL (OMNIPAQUE 350) 350 mg iodine/mL injection (75 mLs Intravenous Given 4/16/25 1805)   nitroGLYCERIN SL tablet 0.4 mg (0.4 mg Sublingual Given 4/16/25 1844)   nitroGLYCERIN 2% TD oint ointment 1 inch (1 inch Transdermal Given 4/16/25 1844)   heparin 25,000 units in dextrose 5% (100 units/ml) IV bolus from bag LOW INTENSITY nomogram - [facility] (3,990 Units Intravenous Bolus from Bag 4/16/25 1925)     Medical Decision Making  Amount and/or Complexity of Data Reviewed  Labs: ordered.  Radiology: ordered.    Risk  OTC drugs.  Prescription drug management.  Decision regarding hospitalization.                          Medical Decision Making:   Initial Assessment:   49-year-old male with previous history of obesity, gastric bypass, type 2 diabetes, hypertension, hyperlipidemia.  Family history of coronary artery disease before 50 on mother's side.  Patient presents emergency department with intermittent midsternal chest pain with radiation to the right jaw over last 4-5 days.  Patient states worse with exertion.  He is associated with shortness of breath.  Denies nausea, vomiting, no diaphoresis.  Currently at this time patient states pain is 6/10.  Does radiate between his shoulder blades to his back as well.  Patient with no previous cardiac workup.  EKG with normal sinus rhythm, normal axis, nonspecific T-wave changes.  No acute ST segment elevation or depression noted.  Patient did not take any medications prior to arrival.    Differential Diagnosis:   ACS, aortic dissection, pulmonary embolism, NSTEMI  Clinical Tests:   Lab Tests: Ordered and Reviewed  Radiological Study: Ordered and Reviewed  Medical Tests: Reviewed and  Ordered             Clinical Impression:  Final diagnoses:  [R07.9] Chest pain  [I21.4] NSTEMI (non-ST elevated myocardial infarction) (Primary)          ED Disposition Condition    Admit                     [1]   Social History  Tobacco Use    Smoking status: Never   Substance Use Topics    Alcohol use: No    Drug use: No        Norberto Howard MD  04/16/25 4292

## 2025-04-17 ENCOUNTER — CLINICAL SUPPORT (OUTPATIENT)
Dept: CARDIOLOGY | Facility: HOSPITAL | Age: 50
End: 2025-04-17
Attending: EMERGENCY MEDICINE
Payer: MEDICAID

## 2025-04-17 ENCOUNTER — CLINICAL SUPPORT (OUTPATIENT)
Dept: CARDIOLOGY | Facility: HOSPITAL | Age: 50
End: 2025-04-17
Attending: INTERNAL MEDICINE
Payer: MEDICAID

## 2025-04-17 VITALS — BODY MASS INDEX: 40.43 KG/M2 | WEIGHT: 266.75 LBS | HEIGHT: 68 IN

## 2025-04-17 LAB
ABORH RETYPE: NORMAL
ABSOLUTE EOSINOPHIL (SMH): 0.25 K/UL
ABSOLUTE MONOCYTE (SMH): 0.54 K/UL (ref 0.3–1)
ABSOLUTE NEUTROPHIL COUNT (SMH): 5.4 K/UL (ref 1.8–7.7)
AORTIC ROOT ANNULUS: 3.2 CM
AORTIC VALVE CUSP SEPERATION: 2.1 CM
APICAL FOUR CHAMBER EJECTION FRACTION: 47 %
APICAL TWO CHAMBER EJECTION FRACTION: 65 %
APTT PPP: 49.8 SECONDS (ref 21–32)
ASCENDING AORTA: 3.4 CM
AV INDEX (PROSTH): 0.85
AV MEAN GRADIENT: 2 MMHG
AV PEAK GRADIENT: 5 MMHG
AV VALVE AREA BY VELOCITY RATIO: 2.5 CM²
AV VALVE AREA: 2.9 CM²
AV VELOCITY RATIO: 0.73
BASOPHILS # BLD AUTO: 0.04 K/UL
BASOPHILS NFR BLD AUTO: 0.4 %
BSA FOR ECHO PROCEDURE: 2.41 M2
BSA FOR ECHO PROCEDURE: 2.41 M2
CV ECHO LV RWT: 0.45 CM
DOP CALC AO PEAK VEL: 1.1 M/S
DOP CALC AO VTI: 20 CM
DOP CALC LVOT AREA: 3.5 CM2
DOP CALC LVOT DIAMETER: 2.1 CM
DOP CALC LVOT PEAK VEL: 0.8 M/S
DOP CALC LVOT STROKE VOLUME: 58.5 CM3
DOP CALC MV VTI: 20 CM
DOP CALCLVOT PEAK VEL VTI: 16.9 CM
E WAVE DECELERATION TIME: 187 MSEC
E/A RATIO: 0.52
E/E' RATIO: 11 M/S
EAG (SMH): 126 MG/DL (ref 68–131)
ECHO LV POSTERIOR WALL: 1.1 CM (ref 0.6–1.1)
ERYTHROCYTE [DISTWIDTH] IN BLOOD BY AUTOMATED COUNT: 12.4 % (ref 11.5–14.5)
FRACTIONAL SHORTENING: 34.7 % (ref 28–44)
HBA1C MFR BLD: 6 % (ref 4.5–6.2)
HCT VFR BLD AUTO: 42.5 % (ref 40–54)
HGB BLD-MCNC: 13.8 GM/DL (ref 14–18)
IMM GRANULOCYTES # BLD AUTO: 0.02 K/UL (ref 0–0.04)
IMM GRANULOCYTES NFR BLD AUTO: 0.2 % (ref 0–0.5)
INDIRECT COOMBS: NORMAL
INTERVENTRICULAR SEPTUM: 1.4 CM (ref 0.6–1.1)
LEFT ATRIUM AREA SYSTOLIC (APICAL 4 CHAMBER): 17.9 CM2
LEFT ATRIUM SIZE: 3.6 CM
LEFT INTERNAL DIMENSION IN SYSTOLE: 3.2 CM (ref 2.1–4)
LEFT VENTRICLE DIASTOLIC VOLUME INDEX: 48.92 ML/M2
LEFT VENTRICLE DIASTOLIC VOLUME: 113 ML
LEFT VENTRICLE END DIASTOLIC VOLUME APICAL 2 CHAMBER: 58.6 ML
LEFT VENTRICLE END DIASTOLIC VOLUME APICAL 4 CHAMBER: 139 ML
LEFT VENTRICLE END SYSTOLIC VOLUME APICAL 4 CHAMBER: 48.6 ML
LEFT VENTRICLE MASS INDEX: 103.8 G/M2
LEFT VENTRICLE SYSTOLIC VOLUME INDEX: 17.7 ML/M2
LEFT VENTRICLE SYSTOLIC VOLUME: 41 ML
LEFT VENTRICULAR INTERNAL DIMENSION IN DIASTOLE: 4.9 CM (ref 3.5–6)
LEFT VENTRICULAR MASS: 239.9 G
LV LATERAL E/E' RATIO: 9 M/S
LV SEPTAL E/E' RATIO: 13.5 M/S
LVED V (TEICH): 113 ML
LVES V (TEICH): 41 ML
LVOT MG: 2 MMHG
LVOT MV: 0.6 CM/S
LYMPHOCYTES # BLD AUTO: 2.77 K/UL (ref 1–4.8)
MCH RBC QN AUTO: 29.5 PG (ref 27–31)
MCHC RBC AUTO-ENTMCNC: 32.5 G/DL (ref 32–36)
MCV RBC AUTO: 91 FL (ref 82–98)
MV MEAN GRADIENT: 1 MMHG
MV PEAK A VEL: 1.03 M/S
MV PEAK E VEL: 0.54 M/S
MV PEAK GRADIENT: 5 MMHG
MV STENOSIS PRESSURE HALF TIME: 64 MS
MV VALVE AREA BY CONTINUITY EQUATION: 2.93 CM2
MV VALVE AREA P 1/2 METHOD: 3.44 CM2
NUCLEATED RBC (/100WBC) (SMH): 0 /100 WBC
OHS CV RV/LV RATIO: 0.43 CM
OHS QRS DURATION: 68 MS
OHS QTC CALCULATION: 449 MS
PISA TR MAX VEL: 2.4 M/S
PLATELET # BLD AUTO: 218 K/UL (ref 150–450)
PMV BLD AUTO: 10.7 FL (ref 9.2–12.9)
POC ACTIVATED CLOTTING TIME K: 233 SEC (ref 74–137)
POC ACTIVATED CLOTTING TIME K: 245 SEC (ref 74–137)
POC ACTIVATED CLOTTING TIME K: 268 SEC (ref 74–137)
POCT GLUCOSE: 155 MG/DL (ref 70–110)
POCT GLUCOSE: 159 MG/DL (ref 70–110)
POCT GLUCOSE: 165 MG/DL (ref 70–110)
PV MV: 0.6 M/S
PV PEAK GRADIENT: 3 MMHG
PV PEAK VELOCITY: 0.87 M/S
RBC # BLD AUTO: 4.68 M/UL (ref 4.6–6.2)
RELATIVE EOSINOPHIL (SMH): 2.8 % (ref 0–8)
RELATIVE LYMPHOCYTE (SMH): 30.6 % (ref 18–48)
RELATIVE MONOCYTE (SMH): 6 % (ref 4–15)
RELATIVE NEUTROPHIL (SMH): 60 % (ref 38–73)
RH BLD: NORMAL
RIGHT ATRIUM VOLUME AREA LENGTH APICAL 4 CHAMBER: 42.5 ML
RIGHT VENTRICLE DIASTOLIC BASEL DIMENSION: 2.1 CM
RIGHT VENTRICULAR END-DIASTOLIC DIMENSION: 2.1 CM
RV TISSUE DOPPLER FREE WALL SYSTOLIC VELOCITY 1 (APICAL 4 CHAMBER VIEW): 10.1 CM/S
SAMPLE: ABNORMAL
SPECIMEN OUTDATE: NORMAL
TDI LATERAL: 0.06 M/S
TDI SEPTAL: 0.04 M/S
TDI: 0.05 M/S
TR MAX PG: 22 MMHG
TROPONIN HIGH SENSITIVE (SMH): 4486.3 PG/ML
TROPONIN HIGH SENSITIVE (SMH): ABNORMAL PG/ML
WBC # BLD AUTO: 9.06 K/UL (ref 3.9–12.7)
Z-SCORE OF LEFT VENTRICULAR DIMENSION IN END DIASTOLE: -6.17
Z-SCORE OF LEFT VENTRICULAR DIMENSION IN END SYSTOLE: -4.25

## 2025-04-17 PROCEDURE — 25000003 PHARM REV CODE 250: Performed by: STUDENT IN AN ORGANIZED HEALTH CARE EDUCATION/TRAINING PROGRAM

## 2025-04-17 PROCEDURE — 93306 TTE W/DOPPLER COMPLETE: CPT | Mod: 26,,, | Performed by: GENERAL PRACTICE

## 2025-04-17 PROCEDURE — 94761 N-INVAS EAR/PLS OXIMETRY MLT: CPT

## 2025-04-17 PROCEDURE — 25000242 PHARM REV CODE 250 ALT 637 W/ HCPCS: Performed by: STUDENT IN AN ORGANIZED HEALTH CARE EDUCATION/TRAINING PROGRAM

## 2025-04-17 PROCEDURE — 84484 ASSAY OF TROPONIN QUANT: CPT | Performed by: INTERNAL MEDICINE

## 2025-04-17 PROCEDURE — C1874 STENT, COATED/COV W/DEL SYS: HCPCS | Performed by: INTERNAL MEDICINE

## 2025-04-17 PROCEDURE — 93005 ELECTROCARDIOGRAM TRACING: CPT | Performed by: GENERAL PRACTICE

## 2025-04-17 PROCEDURE — 63600175 PHARM REV CODE 636 W HCPCS: Performed by: INTERNAL MEDICINE

## 2025-04-17 PROCEDURE — C1887 CATHETER, GUIDING: HCPCS | Performed by: INTERNAL MEDICINE

## 2025-04-17 PROCEDURE — 96374 THER/PROPH/DIAG INJ IV PUSH: CPT | Performed by: INTERNAL MEDICINE

## 2025-04-17 PROCEDURE — 36415 COLL VENOUS BLD VENIPUNCTURE: CPT | Performed by: STUDENT IN AN ORGANIZED HEALTH CARE EDUCATION/TRAINING PROGRAM

## 2025-04-17 PROCEDURE — 93005 ELECTROCARDIOGRAM TRACING: CPT

## 2025-04-17 PROCEDURE — 94799 UNLISTED PULMONARY SVC/PX: CPT

## 2025-04-17 PROCEDURE — B240ZZ3 ULTRASONOGRAPHY OF SINGLE CORONARY ARTERY, INTRAVASCULAR: ICD-10-PCS | Performed by: INTERNAL MEDICINE

## 2025-04-17 PROCEDURE — C1894 INTRO/SHEATH, NON-LASER: HCPCS | Performed by: INTERNAL MEDICINE

## 2025-04-17 PROCEDURE — 63600175 PHARM REV CODE 636 W HCPCS: Performed by: STUDENT IN AN ORGANIZED HEALTH CARE EDUCATION/TRAINING PROGRAM

## 2025-04-17 PROCEDURE — 93458 L HRT ARTERY/VENTRICLE ANGIO: CPT | Mod: 59 | Performed by: INTERNAL MEDICINE

## 2025-04-17 PROCEDURE — 93010 ELECTROCARDIOGRAM REPORT: CPT | Mod: ,,, | Performed by: GENERAL PRACTICE

## 2025-04-17 PROCEDURE — 99152 MOD SED SAME PHYS/QHP 5/>YRS: CPT | Performed by: INTERNAL MEDICINE

## 2025-04-17 PROCEDURE — 25000003 PHARM REV CODE 250: Performed by: INTERNAL MEDICINE

## 2025-04-17 PROCEDURE — 93308 TTE F-UP OR LMTD: CPT

## 2025-04-17 PROCEDURE — 99900035 HC TECH TIME PER 15 MIN (STAT)

## 2025-04-17 PROCEDURE — B2111ZZ FLUOROSCOPY OF MULTIPLE CORONARY ARTERIES USING LOW OSMOLAR CONTRAST: ICD-10-PCS | Performed by: INTERNAL MEDICINE

## 2025-04-17 PROCEDURE — 027035Z DILATION OF CORONARY ARTERY, ONE ARTERY WITH TWO DRUG-ELUTING INTRALUMINAL DEVICES, PERCUTANEOUS APPROACH: ICD-10-PCS | Performed by: INTERNAL MEDICINE

## 2025-04-17 PROCEDURE — 25000003 PHARM REV CODE 250: Performed by: NURSE PRACTITIONER

## 2025-04-17 PROCEDURE — 99153 MOD SED SAME PHYS/QHP EA: CPT | Performed by: INTERNAL MEDICINE

## 2025-04-17 PROCEDURE — 84484 ASSAY OF TROPONIN QUANT: CPT | Performed by: STUDENT IN AN ORGANIZED HEALTH CARE EDUCATION/TRAINING PROGRAM

## 2025-04-17 PROCEDURE — C1753 CATH, INTRAVAS ULTRASOUND: HCPCS | Performed by: INTERNAL MEDICINE

## 2025-04-17 PROCEDURE — C1725 CATH, TRANSLUMIN NON-LASER: HCPCS | Performed by: INTERNAL MEDICINE

## 2025-04-17 PROCEDURE — 92978 ENDOLUMINL IVUS OCT C 1ST: CPT | Mod: LD | Performed by: INTERNAL MEDICINE

## 2025-04-17 PROCEDURE — C9600 PERC DRUG-EL COR STENT SING: HCPCS | Mod: LD | Performed by: INTERNAL MEDICINE

## 2025-04-17 PROCEDURE — 4A023N7 MEASUREMENT OF CARDIAC SAMPLING AND PRESSURE, LEFT HEART, PERCUTANEOUS APPROACH: ICD-10-PCS | Performed by: INTERNAL MEDICINE

## 2025-04-17 PROCEDURE — 21000000 HC CCU ICU ROOM CHARGE

## 2025-04-17 PROCEDURE — 93306 TTE W/DOPPLER COMPLETE: CPT

## 2025-04-17 PROCEDURE — 85730 THROMBOPLASTIN TIME PARTIAL: CPT | Performed by: STUDENT IN AN ORGANIZED HEALTH CARE EDUCATION/TRAINING PROGRAM

## 2025-04-17 PROCEDURE — 99900031 HC PATIENT EDUCATION (STAT)

## 2025-04-17 PROCEDURE — C1769 GUIDE WIRE: HCPCS | Performed by: INTERNAL MEDICINE

## 2025-04-17 PROCEDURE — 99223 1ST HOSP IP/OBS HIGH 75: CPT | Mod: ,,, | Performed by: INTERNAL MEDICINE

## 2025-04-17 PROCEDURE — 85025 COMPLETE CBC W/AUTO DIFF WBC: CPT | Performed by: STUDENT IN AN ORGANIZED HEALTH CARE EDUCATION/TRAINING PROGRAM

## 2025-04-17 DEVICE — EVEROLIMUS-ELUTING PLATINUM CHROMIUM CORONARY STENT SYSTEM
Type: IMPLANTABLE DEVICE | Site: CORONARY | Status: FUNCTIONAL
Brand: SYNERGY™ XD

## 2025-04-17 RX ORDER — ACETAMINOPHEN 325 MG/1
TABLET ORAL
Status: DISPENSED
Start: 2025-04-17 | End: 2025-04-18

## 2025-04-17 RX ORDER — DIPHENHYDRAMINE HCL 25 MG
50 CAPSULE ORAL
OUTPATIENT
Start: 2025-04-17

## 2025-04-17 RX ORDER — LISINOPRIL 5 MG/1
5 TABLET ORAL DAILY
Status: DISCONTINUED | OUTPATIENT
Start: 2025-04-17 | End: 2025-04-17

## 2025-04-17 RX ORDER — LIDOCAINE HYDROCHLORIDE 10 MG/ML
INJECTION, SOLUTION INFILTRATION; PERINEURAL
Status: DISCONTINUED | OUTPATIENT
Start: 2025-04-17 | End: 2025-04-17 | Stop reason: HOSPADM

## 2025-04-17 RX ORDER — MIDAZOLAM HYDROCHLORIDE 1 MG/ML
INJECTION INTRAMUSCULAR; INTRAVENOUS
Status: DISCONTINUED | OUTPATIENT
Start: 2025-04-17 | End: 2025-04-17 | Stop reason: HOSPADM

## 2025-04-17 RX ORDER — FENTANYL CITRATE 50 UG/ML
INJECTION, SOLUTION INTRAMUSCULAR; INTRAVENOUS
Status: DISCONTINUED | OUTPATIENT
Start: 2025-04-17 | End: 2025-04-17 | Stop reason: HOSPADM

## 2025-04-17 RX ORDER — LOSARTAN POTASSIUM 50 MG/1
50 TABLET ORAL DAILY
Status: DISCONTINUED | OUTPATIENT
Start: 2025-04-17 | End: 2025-04-19 | Stop reason: HOSPADM

## 2025-04-17 RX ORDER — CARVEDILOL 6.25 MG/1
6.25 TABLET ORAL 2 TIMES DAILY WITH MEALS
Status: DISCONTINUED | OUTPATIENT
Start: 2025-04-17 | End: 2025-04-19

## 2025-04-17 RX ORDER — CLOPIDOGREL BISULFATE 75 MG/1
75 TABLET ORAL DAILY
Status: DISCONTINUED | OUTPATIENT
Start: 2025-04-18 | End: 2025-04-19 | Stop reason: HOSPADM

## 2025-04-17 RX ORDER — HEPARIN SODIUM 10000 [USP'U]/ML
INJECTION, SOLUTION INTRAVENOUS; SUBCUTANEOUS
Status: DISCONTINUED | OUTPATIENT
Start: 2025-04-17 | End: 2025-04-17 | Stop reason: HOSPADM

## 2025-04-17 RX ORDER — METOPROLOL TARTRATE 1 MG/ML
2.5 INJECTION, SOLUTION INTRAVENOUS ONCE
Status: COMPLETED | OUTPATIENT
Start: 2025-04-17 | End: 2025-04-17

## 2025-04-17 RX ORDER — LABETALOL HYDROCHLORIDE 5 MG/ML
INJECTION, SOLUTION INTRAVENOUS
Status: DISCONTINUED | OUTPATIENT
Start: 2025-04-17 | End: 2025-04-17 | Stop reason: HOSPADM

## 2025-04-17 RX ORDER — METOPROLOL TARTRATE 1 MG/ML
INJECTION, SOLUTION INTRAVENOUS
Status: DISPENSED
Start: 2025-04-17 | End: 2025-04-17

## 2025-04-17 RX ORDER — CLOPIDOGREL BISULFATE 75 MG/1
TABLET ORAL
Status: DISCONTINUED | OUTPATIENT
Start: 2025-04-17 | End: 2025-04-17 | Stop reason: HOSPADM

## 2025-04-17 RX ORDER — NITROGLYCERIN 0.4 MG/1
TABLET SUBLINGUAL
Status: DISPENSED
Start: 2025-04-17 | End: 2025-04-18

## 2025-04-17 RX ORDER — NITROGLYCERIN 5 MG/ML
INJECTION, SOLUTION INTRAVENOUS
Status: DISCONTINUED | OUTPATIENT
Start: 2025-04-17 | End: 2025-04-17 | Stop reason: HOSPADM

## 2025-04-17 RX ORDER — SODIUM CHLORIDE 9 MG/ML
INJECTION, SOLUTION INTRAVENOUS CONTINUOUS
Status: DISCONTINUED | OUTPATIENT
Start: 2025-04-17 | End: 2025-04-18

## 2025-04-17 RX ADMIN — SODIUM CHLORIDE: 9 INJECTION, SOLUTION INTRAVENOUS at 12:04

## 2025-04-17 RX ADMIN — LABETALOL HYDROCHLORIDE 10 MG: 5 INJECTION, SOLUTION INTRAVENOUS at 12:04

## 2025-04-17 RX ADMIN — NITROGLYCERIN 0.4 MG: 0.4 TABLET SUBLINGUAL at 12:04

## 2025-04-17 RX ADMIN — LABETALOL HYDROCHLORIDE 10 MG: 5 INJECTION, SOLUTION INTRAVENOUS at 06:04

## 2025-04-17 RX ADMIN — CARVEDILOL 6.25 MG: 6.25 TABLET, FILM COATED ORAL at 04:04

## 2025-04-17 RX ADMIN — ACETAMINOPHEN 650 MG: 325 TABLET ORAL at 12:04

## 2025-04-17 RX ADMIN — LOSARTAN POTASSIUM 50 MG: 50 TABLET, FILM COATED ORAL at 04:04

## 2025-04-17 RX ADMIN — ASPIRIN 81 MG: 81 TABLET, CHEWABLE ORAL at 08:04

## 2025-04-17 RX ADMIN — METOROPROLOL TARTRATE 2.5 MG: 5 INJECTION, SOLUTION INTRAVENOUS at 08:04

## 2025-04-17 NOTE — H&P
Count includes the [facility] Medicine  History & Physical    Patient Name: Janae Gill  MRN: [id number]  Patient Class: IP- Inpatient  Admission Date: 4/16/2025  Attending Physician: Neela Corrales MD   Primary Care Provider: Juany Malik MD         Patient information was obtained from patient and ER records.     Subjective:     Principal Problem:NSTEMI (non-ST elevated myocardial infarction)    Chief Complaint:   Chief Complaint   Patient presents with    Chest Pain     Chest pain that comes and goes since Friday stated his pains today stopped him from being able to continue his work         HPI: 49 year old male with comorbid conditions of obesity, h/o gastric bypass, type 2 diabete (off medications), hypertension (off medications), hyperlipidemia presented to [facility] due to intermittent chest pain for 4-5 days.  Pain is midsternal, radiating to right jaw and back between shoulder blades, worse with exertion, associated with shortness of breath.  Denies fevers, nausea/vomiting, diarrhea, diaphoresis.  Pain in ED was rated 6/10 which improved with nitroglycerin.    Patient has family history of CAD before 50 on mother's side.    Labs revealed elevated troponin to 460, EKG with normal sinus rhtyhm, nonspecific t-wave changes. Patient was started on heparin infusion and transferred to [facility] for cardiology evaluation and ICU monitoring.         Past Medical History:   Diagnosis Date    Asthma     Diabetes mellitus     Hypertension     Obesity        History reviewed. No pertinent surgical history.    Review of patient's allergies indicates:   Allergen Reactions    Codeine        No current facility-administered medications on file prior to encounter.     Current Outpatient Medications on File Prior to Encounter   Medication Sig    amLODIPine (NORVASC) 10 MG tablet Take 1 tablet by mouth once daily.    atorvastatin (LIPITOR) 80 MG tablet Take 1 tablet by mouth once daily.    budesonide-formoterol 80-4.5 mcg  (SYMBICORT) 80-4.5 mcg/actuation HFAA Symbicort 80-4.5 MCG/ACT Inhalation Aerosol QTY: 10.2 gram Days: 30 Refills: 1  Written: 02/17/23 Patient Instructions: inhale 2 puff twice a day    chlorthalidone (HYGROTEN) 50 MG Tab Take 50 mg by mouth.    CIALIS 20 mg Tab Take 20 mg by mouth daily as needed.    gabapentin (NEURONTIN) 250 mg/5 mL solution Take 250 mg by mouth.    JARDIANCE 25 mg tablet Take 25 mg by mouth.    valACYclovir (VALTREX) 1000 MG tablet Take 1 tablet (1,000 mg total) by mouth 3 (three) times daily.    VITAMIN D3 10 mcg (400 unit) tablet Take 1 tablet by mouth once daily.    white petrolatum-mineral oil 56.8-42.5% (LACRI-LUBE S.O.P.) 56.8-42.5 % Oint Place into both eyes every evening.     Family History    None       Tobacco Use    Smoking status: Never    Smokeless tobacco: Not on file   Substance and Sexual Activity    Alcohol use: No    Drug use: No    Sexual activity: Not on file     Review of Systems   Constitutional:  Negative for chills, fatigue and fever.   HENT:  Negative for ear pain, rhinorrhea, sinus pain and sore throat.    Eyes:  Negative for visual disturbance.   Respiratory:  Positive for shortness of breath. Negative for cough and wheezing.    Cardiovascular:  Positive for chest pain. Negative for palpitations and leg swelling.   Gastrointestinal:  Negative for constipation, diarrhea, nausea and vomiting.   Endocrine: Negative for polyuria.   Genitourinary:  Negative for dysuria and hematuria.   Musculoskeletal:  Negative for back pain.   Skin:  Negative for pallor and rash.   Neurological:  Negative for dizziness, seizures, syncope and headaches.   Psychiatric/Behavioral:  Negative for confusion.      Objective:     Vital Signs (Most Recent):  Temp: 98.2 °F (36.8 °C) (04/16/25 1810)  Pulse: 75 (04/16/25 1930)  Resp: 16 (04/16/25 1930)  BP: (!) 148/88 (04/16/25 1930)  SpO2: 97 % (04/16/25 1930) Vital Signs (24h Range):  Temp:  [98.1 °F (36.7 °C)-98.2 °F (36.8 °C)] 98.2 °F (36.8  "°C)  Pulse:  [75-94] 75  Resp:  [16-18] 16  SpO2:  [95 %-99 %] 97 %  BP: (144-182)/() 148/88     Weight: 121 kg (266 lb 12.1 oz)  Body mass index is 40.56 kg/m².     Physical Exam  Constitutional:       General: He is not in acute distress.     Appearance: He is obese. He is not ill-appearing or toxic-appearing.      Comments: On nasal cannula    HENT:      Head: Normocephalic and atraumatic.      Mouth/Throat:      Pharynx: No oropharyngeal exudate or posterior oropharyngeal erythema.   Eyes:      General: No scleral icterus.     Extraocular Movements: Extraocular movements intact.      Pupils: Pupils are equal, round, and reactive to light.   Cardiovascular:      Rate and Rhythm: Normal rate and regular rhythm.      Pulses: Normal pulses.      Heart sounds: Normal heart sounds. No murmur heard.  Pulmonary:      Effort: Pulmonary effort is normal. No respiratory distress.      Breath sounds: Normal breath sounds. No wheezing or rales.   Abdominal:      General: There is no distension.      Palpations: Abdomen is soft.      Tenderness: There is no abdominal tenderness.   Musculoskeletal:      Right lower leg: No edema.      Left lower leg: No edema.   Skin:     General: Skin is warm and dry.      Findings: No bruising.   Neurological:      General: No focal deficit present.      Mental Status: He is oriented to person, place, and time.   Psychiatric:         Mood and Affect: Mood normal.         Behavior: Behavior normal.              CRANIAL NERVES     CN III, IV, VI   Pupils are equal, round, and reactive to light.       Significant Labs: All pertinent labs within the past 24 hours have been reviewed.  A1C: No results for input(s): "HGBA1C" in the last 4320 hours.  Bilirubin:   Recent Labs   Lab 04/16/25  1653   BILITOT 0.3     Blood Culture: No results for input(s): "LABBLOO" in the last 48 hours.  BMP:   Recent Labs   Lab 04/16/25  1653      K 4.6   CO2 25   BUN 14   CREATININE 1.4   CALCIUM 8.7   MG " "1.9     CBC:   Recent Labs   Lab 04/16/25  1653   WBC 6.94   HGB 13.2*   HCT 40.0        CMP:   Recent Labs   Lab 04/16/25  1653      K 4.6   CO2 25   BUN 14   CREATININE 1.4   CALCIUM 8.7   ALBUMIN 3.7   BILITOT 0.3   ALKPHOS 100   AST 27   ALT 13     Cardiac Markers:   Recent Labs   Lab 04/16/25  1653   *     Coagulation:   Recent Labs   Lab 04/16/25  1653   INR 1.0     Lactic Acid: No results for input(s): "LACTATE" in the last 48 hours.  Lipase: No results for input(s): "LIPASE" in the last 48 hours.  Lipid Panel: No results for input(s): "CHOL", "HDL", "LDLCALC", "TRIG", "CHOLHDL" in the last 48 hours.  Magnesium:   Recent Labs   Lab 04/16/25  1653   MG 1.9     Troponin:   Recent Labs   Lab 04/16/25  1653   TROPONINIHS 482*     TSH: No results for input(s): "TSH" in the last 4320 hours.  Urine Studies: No results for input(s): "COLORU", "APPEARANCEUA", "PHUR", "SPECGRAV", "PROTEINUA", "GLUCUA", "KETONESU", "BILIRUBINUA", "OCCULTUA", "NITRITE", "UROBILINOGEN", "LEUKOCYTESUR", "RBCUA", "WBCUA", "BACTERIA", "SQUAMEPITHEL", "HYALINECASTS" in the last 48 hours.    Invalid input(s): "WRIGHTSUR"    Significant Imaging: I have reviewed all pertinent imaging results/findings within the past 24 hours.  Assessment/Plan:     Assessment & Plan  NSTEMI (non-ST elevated myocardial infarction)  Patient presented with chest pain and has elevated troponin on blood work.  Troponin 482 >1802, EKG without st elevations.      Continue heparin infusion - monitor aPTT per protocol  Trend troponin to peak  Nitroglycerin PRN  Pain control PRN - morphine   Supplemental oxygen  Obtain ECHO  Cardiology consult  Telemetry monitoring   Serial EKG as needed   Keep NPO for possible cath   Start aspirin, high dose statin     Hypertension  Patient's blood pressure range in the last 24 hours was: BP  Min: 144/89  Max: 182/108.The patient's inpatient anti-hypertensive regimen is listed below:  Current " "Antihypertensives  nitroGLYCERIN SL tablet 0.4 mg, Every 5 min PRN, Sublingual  labetaloL injection 10 mg, Every 6 hours PRN, Intravenous    Plan  - BP is controlled, no changes needed to their regimen  - PRN labetalol   Type 2 diabetes mellitus  Patient's FSGs are controlled on current medication regimen.  Last A1c reviewed- No results found for: "LABA1C", "HGBA1C"  Most recent fingerstick glucose reviewed- No results for input(s): "POCTGLUCOSE" in the last 24 hours.  Current correctional scale  Low  Maintain anti-hyperglycemic dose as follows-   Antihyperglycemics (From admission, onward)      Start     Stop Route Frequency Ordered    04/16/25 2350  insulin aspart U-100 pen 0-5 Units         -- SubQ Before meals & nightly PRN 04/16/25 2251          Hold Oral hypoglycemics while patient is in the hospital.  Patient reports being off antihyperglycemic medications and does not take insulin   History of gastric bypass  Noted    VTE Risk Mitigation (From admission, onward)           Ordered     Reason for No Pharmacological VTE Prophylaxis  Once        Question:  Reasons:  Answer:  IV Heparin w/in 24 hrs. Pre or Post-Op    04/16/25 2232     Place sequential compression device  Until discontinued         04/16/25 2232     IP VTE HIGH RISK PATIENT  Once         04/16/25 2227     Place sequential compression device  Until discontinued         04/16/25 2227     heparin 25,000 units in dextrose 5% (100 units/ml) IV bolus from bag LOW INTENSITY nomogram - [facility]  As needed (PRN)        Question:  Heparin Infusion Adjustment (DO NOT MODIFY ANSWER)  Answer:  [email or web address]\epic\Images\Pharmacy\HeparinInfusions\heparin LOW INTENSITY nomogram for [facility] [id number]    04/16/25 1831     heparin 25,000 units in dextrose 5% (100 units/ml) IV bolus from bag LOW INTENSITY nomogram - [facility]  As needed (PRN)        Question:  Heparin Infusion Adjustment (DO NOT MODIFY ANSWER)  Answer:  [email or web address]\epic\Images\Pharmacy\HeparinInfusions\heparin " LOW INTENSITY nomogram for [facility] [id number]    04/16/25 1831     heparin 25,000 units in dextrose 5% 250 mL (100 units/mL) infusion LOW INTENSITY nomogram - [facility]  Continuous        Question:  Begin at (units/kg/hr)  Answer:  12 04/16/25 1831                  Critical care time spent on the evaluation and treatment of severe organ dysfunction, review of pertinent labs and imaging studies, discussions with consulting providers and discussions with patient/family: 45 minutes.                  Neela Corrales MD  Department of Hospital Medicine  [facility]

## 2025-04-17 NOTE — HPI
49 year old male with comorbid conditions of obesity, h/o gastric bypass, type 2 diabete (off medications), hypertension (off medications), hyperlipidemia presented to [facility] due to intermittent chest pain for 4-5 days.  Pain is midsternal, radiating to right jaw and back between shoulder blades, worse with exertion, associated with shortness of breath.  Denies fevers, nausea/vomiting, diarrhea, diaphoresis.  Pain in ED was rated 6/10 which improved with nitroglycerin.    Patient has family history of CAD before 50 on mother's side.    Labs revealed elevated troponin to 460, EKG with normal sinus rhtyhm, nonspecific t-wave changes. Patient was started on heparin infusion and transferred to [facility] for cardiology evaluation and ICU monitoring.

## 2025-04-17 NOTE — CONSULTS
Attempted to see pt all day but he was never admitted to a room so I attached a Healthy Heart Nutrition Therapy handout to his discharge papers.

## 2025-04-17 NOTE — SUBJECTIVE & OBJECTIVE
Interval History:  Seen and examined.  Complains of substernal pain radiating to his back as well as headache.  Currently getting TTE.  Has been seen by Cardiology and they are taking him to cath lab this morning.  Lopressor has been started by Cardiology.  Blood pressure elevated.    Review of Systems   Cardiovascular:  Positive for chest pain.   Musculoskeletal:  Positive for back pain.   Neurological:  Positive for headaches.     Objective:     Vital Signs (Most Recent):  Temp: 97.9 °F (36.6 °C) (04/17/25 0332)  Pulse: 77 (04/17/25 0818)  Resp: 19 (04/17/25 0701)  BP: (!) 177/103 (04/17/25 0818)  SpO2: 95 % (04/17/25 0701) Vital Signs (24h Range):  Temp:  [97.9 °F (36.6 °C)-98.7 °F (37.1 °C)] 97.9 °F (36.6 °C)  Pulse:  [68-94] 77  Resp:  [3-36] 19  SpO2:  [92 %-99 %] 95 %  BP: (140-191)/() 177/103     Weight: 121 kg (266 lb 12.1 oz)  Body mass index is 40.56 kg/m².    Intake/Output Summary (Last 24 hours) at 4/17/2025 0825  Last data filed at 4/17/2025 0515  Gross per 24 hour   Intake --   Output 225 ml   Net -225 ml         Physical Exam  Constitutional:       General: He is not in acute distress.     Appearance: He is well-developed.   HENT:      Head: Normocephalic and atraumatic.   Eyes:      Conjunctiva/sclera: Conjunctivae normal.   Cardiovascular:      Rate and Rhythm: Normal rate and regular rhythm.      Heart sounds: No murmur heard.  Pulmonary:      Effort: Pulmonary effort is normal. No respiratory distress.      Breath sounds: Normal breath sounds. No wheezing or rales.   Abdominal:      General: Bowel sounds are normal. There is no distension.      Palpations: Abdomen is soft.      Tenderness: There is no abdominal tenderness.   Musculoskeletal:         General: Normal range of motion.   Skin:     General: Skin is warm and dry.      Findings: No rash.   Neurological:      Mental Status: He is alert and oriented to person, place, and time.      Cranial Nerves: No cranial nerve deficit.    Psychiatric:         Behavior: Behavior normal.               Significant Labs: All pertinent labs within the past 24 hours have been reviewed.    Significant Imaging: I have reviewed all pertinent imaging results/findings within the past 24 hours.

## 2025-04-17 NOTE — ASSESSMENT & PLAN NOTE
Patient's FSGs are controlled on current medication regimen.  Last A1c reviewed-   Lab Results   Component Value Date    HGBA1C 6.0 04/16/2025     Most recent fingerstick glucose reviewed-   Recent Labs   Lab 04/17/25  0804   POCTGLUCOSE 155*     Current correctional scale  Low  Maintain anti-hyperglycemic dose as follows-   Antihyperglycemics (From admission, onward)      Start     Stop Route Frequency Ordered    04/16/25 2350  insulin aspart U-100 pen 0-5 Units         -- SubQ Before meals & nightly PRN 04/16/25 2251          Hold Oral hypoglycemics while patient is in the hospital.  Patient reports being off antihyperglycemic medications and does not take insulin

## 2025-04-17 NOTE — BRIEF OP NOTE
Patient underwent angiogram via right radial access.  It showed occluded mid LAD which was treated with PCI with 2 overlapping drug-eluting stents in the proximal in the mid LAD.  Moderate disease in the RCA around 40-50%.  LVEDP was upper normal.  Recommend dual antiplatelet therapy.  Hypertension control, added Coreg and losartan.  Recheck labs in the morning along with troponin.  If patient has recurrent worsening chest discomfort, please call on-call cardiologist with a stat EKG.  Anticipated discharge in 48-72 hours.

## 2025-04-17 NOTE — PLAN OF CARE
Problem: Adult Inpatient Plan of Care  Goal: Plan of Care Review  Outcome: Progressing  Goal: Patient-Specific Goal (Individualized)  Outcome: Progressing  Goal: Absence of Hospital-Acquired Illness or Injury  Outcome: Progressing  Goal: Optimal Comfort and Wellbeing  Outcome: Progressing  Goal: Readiness for Transition of Care  Outcome: Progressing     Problem: Bariatric Environmental Safety  Goal: Safety Maintained with Care  Outcome: Progressing     Problem: Fall Injury Risk  Goal: Absence of Fall and Fall-Related Injury  Outcome: Progressing     Problem: Acute Coronary Syndrome  Goal: Optimal Adaptation to Illness  Outcome: Progressing  Goal: Absence of Cardiac-Related Pain  Outcome: Progressing  Goal: Normalized Cardiac Rhythm  Outcome: Progressing  Goal: Effective Cardiac Pump Function  Outcome: Progressing  Goal: Adequate Tissue Perfusion  Outcome: Progressing     Problem: Cardiac Catheterization (Diagnostic/Interventional)  Goal: Absence of Bleeding  Outcome: Progressing  Goal: Absence of Contrast-Induced Injury  Outcome: Progressing  Goal: Stable Heart Rate and Rhythm  Outcome: Progressing  Goal: Absence of Embolism Signs and Symptoms  Outcome: Progressing  Goal: Anesthesia/Sedation Recovery  Outcome: Progressing  Goal: Optimal Pain Control and Function  Outcome: Progressing  Goal: Absence of Vascular Access Complication  Outcome: Progressing     Problem: Diabetes Comorbidity  Goal: Blood Glucose Level Within Targeted Range  Outcome: Progressing

## 2025-04-17 NOTE — CONSULTS
[facility]  Cardiology  Consult Note    Patient Name: Janae Gill  MRN: [id number]  Admission Date: 4/16/2025  Hospital Length of Stay: 1 days  Code Status: Full Code   Attending Provider: Gaviota Tirado MD   Consulting Provider: Javon Sharma MD  Primary Care Physician: Juany Malik MD  Principal Problem:NSTEMI (non-ST elevated myocardial infarction)    Patient information was obtained from patient and ER records.     Inpatient consult to Cardiology  Consult performed by: Javon Sharma MD  Consult ordered by: Neela Corrales MD  Reason for consult: NSTEMI        Subjective:     49-year-old male with past medical history of diabetes and hypertension admitted with chest discomfort described as tightness.  Reports having mild chest tightness currently.  Blood pressure elevated.  Troponins elevated.  EKG shows biphasic T-waves in lateral leads.  Echo shows possible subtle apical wall motion abnormality.    Past Medical History:   Diagnosis Date    Asthma     Diabetes mellitus     Hypertension     Obesity        History reviewed. No pertinent surgical history.    Review of patient's allergies indicates:   Allergen Reactions    Codeine        No current facility-administered medications on file prior to encounter.     Current Outpatient Medications on File Prior to Encounter   Medication Sig    CIALIS 20 mg Tab Take 20 mg by mouth daily as needed (ED).    VITAMIN D3 10 mcg (400 unit) tablet Take 1 tablet by mouth once daily.    budesonide-formoterol 80-4.5 mcg (SYMBICORT) 80-4.5 mcg/actuation HFAA Symbicort 80-4.5 MCG/ACT Inhalation Aerosol QTY: 10.2 gram Days: 30 Refills: 1  Written: 02/17/23 Patient Instructions: inhale 2 puff twice a day (Patient not taking: Reported on 4/16/2025)     Family History    None       Tobacco Use    Smoking status: Never    Smokeless tobacco: Not on file   Substance and Sexual Activity    Alcohol use: No    Drug use: No    Sexual activity: Not on file     Review of  Systems   All other systems reviewed and are negative.    Objective:     Vital Signs (Most Recent):  Temp: 97.9 °F (36.6 °C) (04/17/25 0332)  Pulse: 77 (04/17/25 0818)  Resp: 19 (04/17/25 0701)  BP: (!) 177/103 (04/17/25 0818)  SpO2: 95 % (04/17/25 0701) Vital Signs (24h Range):  Temp:  [97.9 °F (36.6 °C)-98.7 °F (37.1 °C)] 97.9 °F (36.6 °C)  Pulse:  [68-94] 77  Resp:  [3-36] 19  SpO2:  [92 %-99 %] 95 %  BP: (140-191)/() 177/103     Weight: 121 kg (266 lb 12.1 oz)  Body mass index is 40.56 kg/m².    SpO2: 95 %         Intake/Output Summary (Last 24 hours) at 4/17/2025 0842  Last data filed at 4/17/2025 0515  Gross per 24 hour   Intake --   Output 225 ml   Net -225 ml       Lines/Drains/Airways       Peripheral Intravenous Line  Duration                  Peripheral IV - Single Lumen 04/16/25 1740 20 G Right Antecubital <1 day         Peripheral IV - Single Lumen 04/16/25 2017 22 G Left;Posterior Hand <1 day                    Physical Exam  Vitals reviewed.   Constitutional:       Appearance: Normal appearance.   Cardiovascular:      Rate and Rhythm: Normal rate and regular rhythm.      Heart sounds: No murmur heard.     No gallop.   Pulmonary:      Effort: Pulmonary effort is normal.   Abdominal:      General: Abdomen is flat.   Skin:     General: Skin is warm.   Neurological:      General: No focal deficit present.      Mental Status: He is alert and oriented to person, place, and time.         Significant Labs: BMP:   Recent Labs   Lab 04/16/25  1653      K 4.6   CO2 25   BUN 14   CREATININE 1.4   CALCIUM 8.7   MG 1.9   , CBC   Recent Labs   Lab 04/16/25 1653 04/17/25  0300   WBC 6.94 9.06   HGB 13.2* 13.8*   HCT 40.0 42.5    218   , and Troponin   Recent Labs   Lab 04/16/25  1653 04/16/25  2042   TROPONINIHS 482* 1,802*       Significant Imaging: Echocardiogram: 2D echo with color flow doppler: No results found for this or any previous visit. and Transthoracic echo (TTE) complete (Cupid Only):    Results for orders placed or performed during the hospital encounter of 04/16/25   Echo *Canceled*    Narrative    The following orders were created for panel order Echo.  Procedure                               Abnormality         Status                     ---------                               -----------         ------                       Please view results for these tests on the individual orders.     Assessment and Plan:     NSTEMI  Hypertension  Past medical history of diabetes    Plan:  - continue with aspirin, statin and heparin drip.  - discussed with patient regarding further management with invasive angiography plus minus PCI. discussed with patient regarding risks and benefits of invasive angiography including risks of pain, bleeding, infection, contrast induced nephropathy, vascular damage, stroke, heart attack, death.  Discussed need for dual antiplatelet therapy if he gets a PCI.  Patient verbalized understanding and would like to proceed with the procedure.  Informed consent signed by the patient.      Active Diagnoses:    Diagnosis Date Noted POA    PRINCIPAL PROBLEM:  NSTEMI (non-ST elevated myocardial infarction) [I21.4] 04/16/2025 Yes    Hypertension [I10] 04/16/2025 Yes    Type 2 diabetes mellitus [E11.9] 04/16/2025 Yes    History of gastric bypass [Z98.84] 04/16/2025 Not Applicable      Problems Resolved During this Admission:    Diagnosis Date Noted Date Resolved POA    Acute on chronic renal failure [N17.9, N18.9] 04/16/2025 04/16/2025 Yes       VTE Risk Mitigation (From admission, onward)           Ordered     Reason for No Pharmacological VTE Prophylaxis  Once        Question:  Reasons:  Answer:  IV Heparin w/in 24 hrs. Pre or Post-Op    04/16/25 2232     Place sequential compression device  Until discontinued         04/16/25 2232     IP VTE HIGH RISK PATIENT  Once         04/16/25 2227     Place sequential compression device  Until discontinued         04/16/25 2227     heparin  25,000 units in dextrose 5% (100 units/ml) IV bolus from bag LOW INTENSITY nomogram - [facility]  As needed (PRN)        Question:  Heparin Infusion Adjustment (DO NOT MODIFY ANSWER)  Answer:  [email or web address]\epic\Images\Pharmacy\HeparinInfusions\heparin LOW INTENSITY nomogram for [facility] [id number]    04/16/25 1831     heparin 25,000 units in dextrose 5% (100 units/ml) IV bolus from bag LOW INTENSITY nomogram - [facility]  As needed (PRN)        Question:  Heparin Infusion Adjustment (DO NOT MODIFY ANSWER)  Answer:  [email or web address]\epic\Images\Pharmacy\HeparinInfusions\heparin LOW INTENSITY nomogram for [facility] [id number].pdf    04/16/25 1831     heparin 25,000 units in dextrose 5% 250 mL (100 units/mL) infusion LOW INTENSITY nomogram - [facility]  Continuous        Question:  Begin at (units/kg/hr)  Answer:  12    04/16/25 1831                    Thank you for your consult. I will follow-up with patient. Please contact us if you have any additional questions.    Javon Sharma MD  Cardiology   Count includes the [facility]

## 2025-04-17 NOTE — NURSING
1045: Patient complaining of pain rated 5/10 in chest radiating to center of his back. SL nitro given x1 and 10mg IV labetalol given per MD order.

## 2025-04-17 NOTE — ED NOTES
Joseph in lab advised the troponin had not been drawn. He reports that he did advise them of the need when ordered at 1940

## 2025-04-17 NOTE — PROGRESS NOTES
[facility] Medicine  Progress Note    Patient Name: Janae Gill  MRN: [id number]  Patient Class: IP- Inpatient   Admission Date: 4/16/2025  Length of Stay: 1 days  Attending Physician: Gaviota Tirado MD  Primary Care Provider: Juany Malik MD        Subjective     Principal Problem:NSTEMI (non-ST elevated myocardial infarction)        HPI:  49 year old male with comorbid conditions of obesity, h/o gastric bypass, type 2 diabete (off medications), hypertension (off medications), hyperlipidemia presented to [facility] due to intermittent chest pain for 4-5 days.  Pain is midsternal, radiating to right jaw and back between shoulder blades, worse with exertion, associated with shortness of breath.  Denies fevers, nausea/vomiting, diarrhea, diaphoresis.  Pain in ED was rated 6/10 which improved with nitroglycerin.    Patient has family history of CAD before 50 on mother's side.    Labs revealed elevated troponin to 460, EKG with normal sinus rhtyhm, nonspecific t-wave changes. Patient was started on heparin infusion and transferred to [facility] for cardiology evaluation and ICU monitoring.         Overview/Hospital Course:  No notes on file    Interval History:  Seen and examined.  Complains of substernal pain radiating to his back as well as headache.  Currently getting TTE.  Has been seen by Cardiology and they are taking him to cath lab this morning.  Lopressor has been started by Cardiology.  Blood pressure elevated.    Review of Systems   Cardiovascular:  Positive for chest pain.   Musculoskeletal:  Positive for back pain.   Neurological:  Positive for headaches.     Objective:     Vital Signs (Most Recent):  Temp: 97.9 °F (36.6 °C) (04/17/25 0332)  Pulse: 77 (04/17/25 0818)  Resp: 19 (04/17/25 0701)  BP: (!) 177/103 (04/17/25 0818)  SpO2: 95 % (04/17/25 0701) Vital Signs (24h Range):  Temp:  [97.9 °F (36.6 °C)-98.7 °F (37.1 °C)] 97.9 °F (36.6 °C)  Pulse:  [68-94] 77  Resp:  [3-36] 19  SpO2:  [92  %-99 %] 95 %  BP: (140-191)/() 177/103     Weight: 121 kg (266 lb 12.1 oz)  Body mass index is 40.56 kg/m².    Intake/Output Summary (Last 24 hours) at 4/17/2025 0847  Last data filed at 4/17/2025 0515  Gross per 24 hour   Intake --   Output 225 ml   Net -225 ml         Physical Exam  Constitutional:       General: He is not in acute distress.     Appearance: He is well-developed.   HENT:      Head: Normocephalic and atraumatic.   Eyes:      Conjunctiva/sclera: Conjunctivae normal.   Cardiovascular:      Rate and Rhythm: Normal rate and regular rhythm.      Heart sounds: No murmur heard.  Pulmonary:      Effort: Pulmonary effort is normal. No respiratory distress.      Breath sounds: Normal breath sounds. No wheezing or rales.   Abdominal:      General: Bowel sounds are normal. There is no distension.      Palpations: Abdomen is soft.      Tenderness: There is no abdominal tenderness.   Musculoskeletal:         General: Normal range of motion.   Skin:     General: Skin is warm and dry.      Findings: No rash.   Neurological:      Mental Status: He is alert and oriented to person, place, and time.      Cranial Nerves: No cranial nerve deficit.   Psychiatric:         Behavior: Behavior normal.               Significant Labs: All pertinent labs within the past 24 hours have been reviewed.    Significant Imaging: I have reviewed all pertinent imaging results/findings within the past 24 hours.      Assessment & Plan  NSTEMI (non-ST elevated myocardial infarction)  Patient presented with chest pain and has elevated troponin on blood work.  Troponin 482 >1802, EKG without st elevations.      Continue heparin infusion - monitor aPTT per protocol  Trend troponin to peak  Nitroglycerin PRN  Pain control PRN - morphine   Supplemental oxygen  Obtain ECHO  Cardiology consult, plan for cath this a.m.  Telemetry monitoring   Continue aspirin, high dose statin     Hypertension  Patient's blood pressure range in the last 24  hours was: BP  Min: 140/86  Max: 191/118.The patient's inpatient anti-hypertensive regimen is listed below:  Current Antihypertensives  nitroGLYCERIN SL tablet 0.4 mg, Every 5 min PRN, Sublingual  labetaloL injection 10 mg, Every 6 hours PRN, Intravenous  metoprolol (LOPRESSOR) 5 mg/5 mL injection, ,     Plan  - BP is elevated.  Labetalol has been added  - PRN labetalol   Type 2 diabetes mellitus  Patient's FSGs are controlled on current medication regimen.  Last A1c reviewed-   Lab Results   Component Value Date    HGBA1C 6.0 04/16/2025     Most recent fingerstick glucose reviewed-   Recent Labs   Lab 04/17/25  0804   POCTGLUCOSE 155*     Current correctional scale  Low  Maintain anti-hyperglycemic dose as follows-   Antihyperglycemics (From admission, onward)      Start     Stop Route Frequency Ordered    04/16/25 2350  insulin aspart U-100 pen 0-5 Units         -- SubQ Before meals & nightly PRN 04/16/25 2251          Hold Oral hypoglycemics while patient is in the hospital.  Patient reports being off antihyperglycemic medications and does not take insulin   History of gastric bypass  Noted    VTE Risk Mitigation (From admission, onward)           Ordered     Reason for No Pharmacological VTE Prophylaxis  Once        Question:  Reasons:  Answer:  IV Heparin w/in 24 hrs. Pre or Post-Op    04/16/25 2232     Place sequential compression device  Until discontinued         04/16/25 2232     IP VTE HIGH RISK PATIENT  Once         04/16/25 2227     Place sequential compression device  Until discontinued         04/16/25 2227     heparin 25,000 units in dextrose 5% (100 units/ml) IV bolus from bag LOW INTENSITY nomogram - [facility]  As needed (PRN)        Question:  Heparin Infusion Adjustment (DO NOT MODIFY ANSWER)  Answer:  [email or web address]\epic\Images\Pharmacy\HeparinInfusions\heparin LOW INTENSITY nomogram for [facility] [id number].pdf    04/16/25 1831     heparin 25,000 units in dextrose 5% (100 units/ml) IV bolus from bag LOW  INTENSITY nomogram - [facility]  As needed (PRN)        Question:  Heparin Infusion Adjustment (DO NOT MODIFY ANSWER)  Answer:  \\[email or web address]\epic\Images\Pharmacy\HeparinInfusions\heparin LOW INTENSITY nomogram for [facility] [id number].pdf    04/16/25 1831     heparin 25,000 units in dextrose 5% 250 mL (100 units/mL) infusion LOW INTENSITY nomogram - [facility]  Continuous        Question:  Begin at (units/kg/hr)  Answer:  12    04/16/25 1831                    Discharge Planning   NIKKI: 4/18/2025     Code Status: Full Code   Medical Readiness for Discharge Date:                            Gaviota Tirado MD  Department of Hospital Medicine   [facility]

## 2025-04-17 NOTE — INTERVAL H&P NOTE
The patient has been examined and the H&P has been reviewed:    I concur with the findings and no changes have occurred since H&P was written.    Procedure risks, benefits and alternative options discussed and understood by patient/family.          Active Hospital Problems    Diagnosis  POA    *NSTEMI (non-ST elevated myocardial infarction) [I21.4]  Yes    Hypertension [I10]  Yes    Type 2 diabetes mellitus [E11.9]  Yes    History of gastric bypass [Z98.84]  Not Applicable      Resolved Hospital Problems    Diagnosis Date Resolved POA    Acute on chronic renal failure [N17.9, N18.9] 04/16/2025 Yes

## 2025-04-17 NOTE — SUBJECTIVE & OBJECTIVE
Past Medical History:   Diagnosis Date    Asthma     Diabetes mellitus     Hypertension     Obesity        History reviewed. No pertinent surgical history.    Review of patient's allergies indicates:   Allergen Reactions    Codeine        No current facility-administered medications on file prior to encounter.     Current Outpatient Medications on File Prior to Encounter   Medication Sig    amLODIPine (NORVASC) 10 MG tablet Take 1 tablet by mouth once daily.    atorvastatin (LIPITOR) 80 MG tablet Take 1 tablet by mouth once daily.    budesonide-formoterol 80-4.5 mcg (SYMBICORT) 80-4.5 mcg/actuation HFAA Symbicort 80-4.5 MCG/ACT Inhalation Aerosol QTY: 10.2 gram Days: 30 Refills: 1  Written: 02/17/23 Patient Instructions: inhale 2 puff twice a day    chlorthalidone (HYGROTEN) 50 MG Tab Take 50 mg by mouth.    CIALIS 20 mg Tab Take 20 mg by mouth daily as needed.    gabapentin (NEURONTIN) 250 mg/5 mL solution Take 250 mg by mouth.    JARDIANCE 25 mg tablet Take 25 mg by mouth.    valACYclovir (VALTREX) 1000 MG tablet Take 1 tablet (1,000 mg total) by mouth 3 (three) times daily.    VITAMIN D3 10 mcg (400 unit) tablet Take 1 tablet by mouth once daily.    white petrolatum-mineral oil 56.8-42.5% (LACRI-LUBE S.O.P.) 56.8-42.5 % Oint Place into both eyes every evening.     Family History    None       Tobacco Use    Smoking status: Never    Smokeless tobacco: Not on file   Substance and Sexual Activity    Alcohol use: No    Drug use: No    Sexual activity: Not on file     Review of Systems   Constitutional:  Negative for chills, fatigue and fever.   HENT:  Negative for ear pain, rhinorrhea, sinus pain and sore throat.    Eyes:  Negative for visual disturbance.   Respiratory:  Positive for shortness of breath. Negative for cough and wheezing.    Cardiovascular:  Positive for chest pain. Negative for palpitations and leg swelling.   Gastrointestinal:  Negative for constipation, diarrhea, nausea and vomiting.   Endocrine:  Negative for polyuria.   Genitourinary:  Negative for dysuria and hematuria.   Musculoskeletal:  Negative for back pain.   Skin:  Negative for pallor and rash.   Neurological:  Negative for dizziness, seizures, syncope and headaches.   Psychiatric/Behavioral:  Negative for confusion.      Objective:     Vital Signs (Most Recent):  Temp: 98.2 °F (36.8 °C) (04/16/25 1810)  Pulse: 75 (04/16/25 1930)  Resp: 16 (04/16/25 1930)  BP: (!) 148/88 (04/16/25 1930)  SpO2: 97 % (04/16/25 1930) Vital Signs (24h Range):  Temp:  [98.1 °F (36.7 °C)-98.2 °F (36.8 °C)] 98.2 °F (36.8 °C)  Pulse:  [75-94] 75  Resp:  [16-18] 16  SpO2:  [95 %-99 %] 97 %  BP: (144-182)/() 148/88     Weight: 121 kg (266 lb 12.1 oz)  Body mass index is 40.56 kg/m².     Physical Exam  Constitutional:       General: He is not in acute distress.     Appearance: He is obese. He is not ill-appearing or toxic-appearing.      Comments: On nasal cannula    HENT:      Head: Normocephalic and atraumatic.      Mouth/Throat:      Pharynx: No oropharyngeal exudate or posterior oropharyngeal erythema.   Eyes:      General: No scleral icterus.     Extraocular Movements: Extraocular movements intact.      Pupils: Pupils are equal, round, and reactive to light.   Cardiovascular:      Rate and Rhythm: Normal rate and regular rhythm.      Pulses: Normal pulses.      Heart sounds: Normal heart sounds. No murmur heard.  Pulmonary:      Effort: Pulmonary effort is normal. No respiratory distress.      Breath sounds: Normal breath sounds. No wheezing or rales.   Abdominal:      General: There is no distension.      Palpations: Abdomen is soft.      Tenderness: There is no abdominal tenderness.   Musculoskeletal:      Right lower leg: No edema.      Left lower leg: No edema.   Skin:     General: Skin is warm and dry.      Findings: No bruising.   Neurological:      General: No focal deficit present.      Mental Status: He is oriented to person, place, and time.   Psychiatric:  "        Mood and Affect: Mood normal.         Behavior: Behavior normal.              CRANIAL NERVES     CN III, IV, VI   Pupils are equal, round, and reactive to light.       Significant Labs: All pertinent labs within the past 24 hours have been reviewed.  A1C: No results for input(s): "HGBA1C" in the last 4320 hours.  Bilirubin:   Recent Labs   Lab 04/16/25  1653   BILITOT 0.3     Blood Culture: No results for input(s): "LABBLOO" in the last 48 hours.  BMP:   Recent Labs   Lab 04/16/25  1653      K 4.6   CO2 25   BUN 14   CREATININE 1.4   CALCIUM 8.7   MG 1.9     CBC:   Recent Labs   Lab 04/16/25  1653   WBC 6.94   HGB 13.2*   HCT 40.0        CMP:   Recent Labs   Lab 04/16/25  1653      K 4.6   CO2 25   BUN 14   CREATININE 1.4   CALCIUM 8.7   ALBUMIN 3.7   BILITOT 0.3   ALKPHOS 100   AST 27   ALT 13     Cardiac Markers:   Recent Labs   Lab 04/16/25  1653   *     Coagulation:   Recent Labs   Lab 04/16/25  1653   INR 1.0     Lactic Acid: No results for input(s): "LACTATE" in the last 48 hours.  Lipase: No results for input(s): "LIPASE" in the last 48 hours.  Lipid Panel: No results for input(s): "CHOL", "HDL", "LDLCALC", "TRIG", "CHOLHDL" in the last 48 hours.  Magnesium:   Recent Labs   Lab 04/16/25  1653   MG 1.9     Troponin:   Recent Labs   Lab 04/16/25  1653   TROPONINIHS 482*     TSH: No results for input(s): "TSH" in the last 4320 hours.  Urine Studies: No results for input(s): "COLORU", "APPEARANCEUA", "PHUR", "SPECGRAV", "PROTEINUA", "GLUCUA", "KETONESU", "BILIRUBINUA", "OCCULTUA", "NITRITE", "UROBILINOGEN", "LEUKOCYTESUR", "RBCUA", "WBCUA", "BACTERIA", "SQUAMEPITHEL", "HYALINECASTS" in the last 48 hours.    Invalid input(s): "WRIGHTSUR"    Significant Imaging: I have reviewed all pertinent imaging results/findings within the past 24 hours.  "

## 2025-04-17 NOTE — PHARMACY MED REC
"              .        Admission Medication History     The home medication history was taken by Emiliana Walton.    You may go to "Admission" then "Reconcile Home Medications" tabs to review and/or act upon these items.     The home medication list has been updated by the Pharmacy department.   Please read ALL comments highlighted in yellow.   Please address this information as you see fit.    Feel free to contact us if you have any questions or require assistance.      The medications listed below were removed from the home medication list. Please reorder if appropriate:  Patient reports no longer taking the following medication(s):  Amlodipine  Lipitor  Chlorthalidone  Gabapentin  Jardiance  Valtrex  Lacri-Lube Ointment    Medications listed below were obtained from: Patient/family and Analytic software- [employer]  Medications Ordered Prior to Encounter[1]        Emiliana Walton  EXT 1919             [1]  No current facility-administered medications on file prior to encounter.     Current Outpatient Medications on File Prior to Encounter   Medication Sig Dispense Refill    CIALIS 20 mg Tab Take 20 mg by mouth daily as needed (ED).      VITAMIN D3 10 mcg (400 unit) tablet Take 1 tablet by mouth once daily.      budesonide-formoterol 80-4.5 mcg (SYMBICORT) 80-4.5 mcg/actuation HFAA Symbicort 80-4.5 MCG/ACT Inhalation Aerosol QTY: 10.2 gram Days: 30 Refills: 1  Written: 02/17/23 Patient Instructions: inhale 2 puff twice a day (Patient not taking: Reported on 4/16/2025)      [DISCONTINUED] amLODIPine (NORVASC) 10 MG tablet Take 1 tablet by mouth once daily.      [DISCONTINUED] atorvastatin (LIPITOR) 80 MG tablet Take 1 tablet by mouth once daily.      [DISCONTINUED] chlorthalidone (HYGROTEN) 50 MG Tab Take 50 mg by mouth.      [DISCONTINUED] gabapentin (NEURONTIN) 250 mg/5 mL solution Take 250 mg by mouth.      [DISCONTINUED] JARDIANCE 25 mg tablet Take 25 mg by mouth.      [DISCONTINUED] valACYclovir " (VALTREX) 1000 MG tablet Take 1 tablet (1,000 mg total) by mouth 3 (three) times daily. 21 tablet 0    [DISCONTINUED] white petrolatum-mineral oil 56.8-42.5% (LACRI-LUBE S.O.P.) 56.8-42.5 % Oint Place into both eyes every evening. 15 g 0

## 2025-04-17 NOTE — PLAN OF CARE
Problem: Adult Inpatient Plan of Care  Goal: Plan of Care Review  Outcome: Progressing  Goal: Patient-Specific Goal (Individualized)  Outcome: Progressing  Goal: Absence of Hospital-Acquired Illness or Injury  Outcome: Progressing  Goal: Optimal Comfort and Wellbeing  Outcome: Progressing  Goal: Readiness for Transition of Care  Outcome: Progressing     Problem: Bariatric Environmental Safety  Goal: Safety Maintained with Care  Outcome: Progressing     Problem: Fall Injury Risk  Goal: Absence of Fall and Fall-Related Injury  Outcome: Progressing     Problem: Acute Coronary Syndrome  Goal: Optimal Adaptation to Illness  Outcome: Progressing  Goal: Absence of Cardiac-Related Pain  Outcome: Progressing  Goal: Normalized Cardiac Rhythm  Outcome: Progressing  Goal: Effective Cardiac Pump Function  Outcome: Progressing  Goal: Adequate Tissue Perfusion  Outcome: Progressing     Problem: Cardiac Catheterization (Diagnostic/Interventional)  Goal: Absence of Bleeding  Outcome: Progressing  Goal: Absence of Contrast-Induced Injury  Outcome: Progressing  Goal: Stable Heart Rate and Rhythm  Outcome: Progressing  Goal: Absence of Embolism Signs and Symptoms  Outcome: Progressing  Goal: Anesthesia/Sedation Recovery  Outcome: Progressing  Goal: Optimal Pain Control and Function  Outcome: Progressing  Goal: Absence of Vascular Access Complication  Outcome: Progressing     Problem: Diabetes Comorbidity  Goal: Blood Glucose Level Within Targeted Range  Outcome: Progressing     Problem: Wound  Goal: Optimal Coping  Outcome: Progressing  Goal: Optimal Functional Ability  Outcome: Progressing  Goal: Absence of Infection Signs and Symptoms  Outcome: Progressing  Goal: Improved Oral Intake  Outcome: Progressing  Goal: Optimal Pain Control and Function  Outcome: Progressing  Goal: Skin Health and Integrity  Outcome: Progressing  Goal: Optimal Wound Healing  Outcome: Progressing

## 2025-04-17 NOTE — PLAN OF CARE
[facility]  Initial Discharge Assessment       Primary Care Provider: Juany Malik MD    Admission Diagnosis: NSTEMI (non-ST elevated myocardial infarction) [I21.4]    Admission Date: 4/16/2025  Expected Discharge Date: 4/18/2025    Transition of Care Barriers: (P) None    Payor: MEDICAID / Plan: [facility] COMMUNITY PLAN [facility] [employer] ([place] MEDICAID) / Product Type: Managed Medicaid /     Extended Emergency Contact Information  Primary Emergency Contact: Micheal Gill  Address: [address]           [place] [address] [address] [place]  Mobile Phone: [phone]  Relation: Spouse    Discharge Plan A: (P) Home with family  Discharge Plan B: (P) Home      [facility] [place] [place] [facility] [address] [place]  [address] [facility] [address]  Phone: [phone] Fax: [phone]      Initial Assessment (most recent)       Adult Discharge Assessment - 04/17/25 1452          Discharge Assessment    Assessment Type Discharge Planning Assessment (P)      Confirmed/corrected address, phone number and insurance Yes (P)      Confirmed Demographics Correct on Facesheet (P)      Source of Information patient (P)      Does patient/caregiver understand observation status Yes (P)      Communicated NIKKI with patient/caregiver Yes (P)      People in Home spouse (P)      Do you expect to return to your current living situation? Yes (P)      Do you have help at home or someone to help you manage your care at home? Yes (P)      Prior to hospitilization cognitive status: Alert/Oriented (P)      Current cognitive status: Alert/Oriented (P)      Walking or Climbing Stairs Difficulty no (P)      Dressing/Bathing Difficulty no (P)      Equipment Currently Used at Home none (P)      Readmission within 30 days? No (P)      Patient currently being followed by outpatient case management? No (P)      Do you currently have service(s) that help you manage your care at home? No (P)      Do you take prescription  medications? Yes (P)      Do you have prescription coverage? Yes (P)      Do you have any problems affording any of your prescribed medications? No (P)      Is the patient taking medications as prescribed? yes (P)      Who is going to help you get home at discharge? spouse (P)      How do you get to doctors appointments? car, drives self (P)      Are you on dialysis? No (P)      Do you take coumadin? No (P)      Discharge Plan A Home with family (P)      Discharge Plan B Home (P)      DME Needed Upon Discharge  none (P)      Discharge Plan discussed with: Patient (P)      Transition of Care Barriers None (P)

## 2025-04-17 NOTE — PLAN OF CARE
04/17/25 1559   Final Note   Hospital Resources/Appts/Education Provided Appointments scheduled and added to AVS       RN CM contacted [facility] to schedule a hospital follow up. Per facility policy, appointments are made the day before preferred appointment date/time. Patient will need to call clinic to schedule hospital follow up.     Appointment scheduled with Dr. Sharma and added to AVS

## 2025-04-17 NOTE — NURSING TRANSFER
Nursing Transfer Note      4/17/2025   3:30 PM    Nurse giving handoff:Praveen WEBB RN  Nurse receiving handoff:Raina WEBB RN    Reason patient is being transferred: s/p angiogram    Transfer To: 2516 from 1405    Transfer via stretcher    Transfer with cardiac monitoring    Transported by Praveen WEBB RN

## 2025-04-17 NOTE — ASSESSMENT & PLAN NOTE
Patient's blood pressure range in the last 24 hours was: BP  Min: 140/86  Max: 191/118.The patient's inpatient anti-hypertensive regimen is listed below:  Current Antihypertensives  nitroGLYCERIN SL tablet 0.4 mg, Every 5 min PRN, Sublingual  labetaloL injection 10 mg, Every 6 hours PRN, Intravenous  metoprolol (LOPRESSOR) 5 mg/5 mL injection, ,     Plan  - BP is elevated.  Labetalol has been added  - PRN labetalol

## 2025-04-17 NOTE — ED NOTES
Report called and given to ROSIBEL Balderas for transfer to [facility] room #7417

## 2025-04-17 NOTE — ASSESSMENT & PLAN NOTE
Patient presented with chest pain and has elevated troponin on blood work.  Troponin 482 >1802, EKG without st elevations.      Continue heparin infusion - monitor aPTT per protocol  Trend troponin to peak  Nitroglycerin PRN  Pain control PRN - morphine   Supplemental oxygen  Obtain ECHO  Cardiology consult  Telemetry monitoring   Serial EKG as needed   Keep NPO for possible cath   Start aspirin, high dose statin

## 2025-04-17 NOTE — ASSESSMENT & PLAN NOTE
"Patient's FSGs are controlled on current medication regimen.  Last A1c reviewed- No results found for: "LABA1C", "HGBA1C"  Most recent fingerstick glucose reviewed- No results for input(s): "POCTGLUCOSE" in the last 24 hours.  Current correctional scale  Low  Maintain anti-hyperglycemic dose as follows-   Antihyperglycemics (From admission, onward)      Start     Stop Route Frequency Ordered    04/16/25 2350  insulin aspart U-100 pen 0-5 Units         -- SubQ Before meals & nightly PRN 04/16/25 2251          Hold Oral hypoglycemics while patient is in the hospital.  Patient reports being off antihyperglycemic medications and does not take insulin   "

## 2025-04-17 NOTE — ASSESSMENT & PLAN NOTE
Patient's blood pressure range in the last 24 hours was: BP  Min: 144/89  Max: 182/108.The patient's inpatient anti-hypertensive regimen is listed below:  Current Antihypertensives  nitroGLYCERIN SL tablet 0.4 mg, Every 5 min PRN, Sublingual  labetaloL injection 10 mg, Every 6 hours PRN, Intravenous    Plan  - BP is controlled, no changes needed to their regimen  - PRN labetalol

## 2025-04-17 NOTE — H&P (VIEW-ONLY)
[facility]  Cardiology  Consult Note    Patient Name: Janae Gill  MRN: [id number]  Admission Date: 4/16/2025  Hospital Length of Stay: 1 days  Code Status: Full Code   Attending Provider: Gaviota Tirado MD   Consulting Provider: Javon Sharma MD  Primary Care Physician: Juany Malik MD  Principal Problem:NSTEMI (non-ST elevated myocardial infarction)    Patient information was obtained from patient and ER records.     Inpatient consult to Cardiology  Consult performed by: Javon Sharma MD  Consult ordered by: Neela Corrales MD  Reason for consult: NSTEMI        Subjective:     49-year-old male with past medical history of diabetes and hypertension admitted with chest discomfort described as tightness.  Reports having mild chest tightness currently.  Blood pressure elevated.  Troponins elevated.  EKG shows biphasic T-waves in lateral leads.  Echo shows possible subtle apical wall motion abnormality.    Past Medical History:   Diagnosis Date    Asthma     Diabetes mellitus     Hypertension     Obesity        History reviewed. No pertinent surgical history.    Review of patient's allergies indicates:   Allergen Reactions    Codeine        No current facility-administered medications on file prior to encounter.     Current Outpatient Medications on File Prior to Encounter   Medication Sig    CIALIS 20 mg Tab Take 20 mg by mouth daily as needed (ED).    VITAMIN D3 10 mcg (400 unit) tablet Take 1 tablet by mouth once daily.    budesonide-formoterol 80-4.5 mcg (SYMBICORT) 80-4.5 mcg/actuation HFAA Symbicort 80-4.5 MCG/ACT Inhalation Aerosol QTY: 10.2 gram Days: 30 Refills: 1  Written: 02/17/23 Patient Instructions: inhale 2 puff twice a day (Patient not taking: Reported on 4/16/2025)     Family History    None       Tobacco Use    Smoking status: Never    Smokeless tobacco: Not on file   Substance and Sexual Activity    Alcohol use: No    Drug use: No    Sexual activity: Not on file     Review of  Systems   All other systems reviewed and are negative.    Objective:     Vital Signs (Most Recent):  Temp: 97.9 °F (36.6 °C) (04/17/25 0332)  Pulse: 77 (04/17/25 0818)  Resp: 19 (04/17/25 0701)  BP: (!) 177/103 (04/17/25 0818)  SpO2: 95 % (04/17/25 0701) Vital Signs (24h Range):  Temp:  [97.9 °F (36.6 °C)-98.7 °F (37.1 °C)] 97.9 °F (36.6 °C)  Pulse:  [68-94] 77  Resp:  [3-36] 19  SpO2:  [92 %-99 %] 95 %  BP: (140-191)/() 177/103     Weight: 121 kg (266 lb 12.1 oz)  Body mass index is 40.56 kg/m².    SpO2: 95 %         Intake/Output Summary (Last 24 hours) at 4/17/2025 0842  Last data filed at 4/17/2025 0515  Gross per 24 hour   Intake --   Output 225 ml   Net -225 ml       Lines/Drains/Airways       Peripheral Intravenous Line  Duration                  Peripheral IV - Single Lumen 04/16/25 1740 20 G Right Antecubital <1 day         Peripheral IV - Single Lumen 04/16/25 2017 22 G Left;Posterior Hand <1 day                    Physical Exam  Vitals reviewed.   Constitutional:       Appearance: Normal appearance.   Cardiovascular:      Rate and Rhythm: Normal rate and regular rhythm.      Heart sounds: No murmur heard.     No gallop.   Pulmonary:      Effort: Pulmonary effort is normal.   Abdominal:      General: Abdomen is flat.   Skin:     General: Skin is warm.   Neurological:      General: No focal deficit present.      Mental Status: He is alert and oriented to person, place, and time.         Significant Labs: BMP:   Recent Labs   Lab 04/16/25  1653      K 4.6   CO2 25   BUN 14   CREATININE 1.4   CALCIUM 8.7   MG 1.9   , CBC   Recent Labs   Lab 04/16/25 1653 04/17/25  0300   WBC 6.94 9.06   HGB 13.2* 13.8*   HCT 40.0 42.5    218   , and Troponin   Recent Labs   Lab 04/16/25  1653 04/16/25  2042   TROPONINIHS 482* 1,802*       Significant Imaging: Echocardiogram: 2D echo with color flow doppler: No results found for this or any previous visit. and Transthoracic echo (TTE) complete (Cupid Only):    Results for orders placed or performed during the hospital encounter of 04/16/25   Echo *Canceled*    Narrative    The following orders were created for panel order Echo.  Procedure                               Abnormality         Status                     ---------                               -----------         ------                       Please view results for these tests on the individual orders.     Assessment and Plan:     NSTEMI  Hypertension  Past medical history of diabetes    Plan:  - continue with aspirin, statin and heparin drip.  - discussed with patient regarding further management with invasive angiography plus minus PCI. discussed with patient regarding risks and benefits of invasive angiography including risks of pain, bleeding, infection, contrast induced nephropathy, vascular damage, stroke, heart attack, death.  Discussed need for dual antiplatelet therapy if he gets a PCI.  Patient verbalized understanding and would like to proceed with the procedure.  Informed consent signed by the patient.      Active Diagnoses:    Diagnosis Date Noted POA    PRINCIPAL PROBLEM:  NSTEMI (non-ST elevated myocardial infarction) [I21.4] 04/16/2025 Yes    Hypertension [I10] 04/16/2025 Yes    Type 2 diabetes mellitus [E11.9] 04/16/2025 Yes    History of gastric bypass [Z98.84] 04/16/2025 Not Applicable      Problems Resolved During this Admission:    Diagnosis Date Noted Date Resolved POA    Acute on chronic renal failure [N17.9, N18.9] 04/16/2025 04/16/2025 Yes       VTE Risk Mitigation (From admission, onward)           Ordered     Reason for No Pharmacological VTE Prophylaxis  Once        Question:  Reasons:  Answer:  IV Heparin w/in 24 hrs. Pre or Post-Op    04/16/25 2232     Place sequential compression device  Until discontinued         04/16/25 2232     IP VTE HIGH RISK PATIENT  Once         04/16/25 2227     Place sequential compression device  Until discontinued         04/16/25 2227     heparin  25,000 units in dextrose 5% (100 units/ml) IV bolus from bag LOW INTENSITY nomogram - [facility]  As needed (PRN)        Question:  Heparin Infusion Adjustment (DO NOT MODIFY ANSWER)  Answer:  [email or web address]\epic\Images\Pharmacy\HeparinInfusions\heparin LOW INTENSITY nomogram for [facility] [id number]    04/16/25 1831     heparin 25,000 units in dextrose 5% (100 units/ml) IV bolus from bag LOW INTENSITY nomogram - [facility]  As needed (PRN)        Question:  Heparin Infusion Adjustment (DO NOT MODIFY ANSWER)  Answer:  [email or web address]\epic\Images\Pharmacy\HeparinInfusions\heparin LOW INTENSITY nomogram for [facility] [id number].pdf    04/16/25 1831     heparin 25,000 units in dextrose 5% 250 mL (100 units/mL) infusion LOW INTENSITY nomogram - [facility]  Continuous        Question:  Begin at (units/kg/hr)  Answer:  12    04/16/25 1831                    Thank you for your consult. I will follow-up with patient. Please contact us if you have any additional questions.    Javon Sharma MD  Cardiology   On license of [facility]

## 2025-04-17 NOTE — ASSESSMENT & PLAN NOTE
Patient presented with chest pain and has elevated troponin on blood work.  Troponin 482 >1802, EKG without st elevations.      Continue heparin infusion - monitor aPTT per protocol  Trend troponin to peak  Nitroglycerin PRN  Pain control PRN - morphine   Supplemental oxygen  Obtain ECHO  Cardiology consult, plan for cath this a.m.  Telemetry monitoring   Continue aspirin, high dose statin

## 2025-04-18 LAB
ABSOLUTE EOSINOPHIL (SMH): 0.15 K/UL
ABSOLUTE MONOCYTE (SMH): 0.68 K/UL (ref 0.3–1)
ABSOLUTE NEUTROPHIL COUNT (SMH): 4.8 K/UL (ref 1.8–7.7)
ALBUMIN SERPL-MCNC: 3.6 G/DL (ref 3.5–5.2)
ALP SERPL-CCNC: 86 UNIT/L (ref 55–135)
ALT SERPL-CCNC: 15 UNIT/L (ref 10–44)
ANION GAP (SMH): 8 MMOL/L (ref 8–16)
AST SERPL-CCNC: 37 UNIT/L (ref 10–40)
BASOPHILS # BLD AUTO: 0.02 K/UL
BASOPHILS NFR BLD AUTO: 0.3 %
BILIRUB SERPL-MCNC: 0.6 MG/DL (ref 0.1–1)
BUN SERPL-MCNC: 16 MG/DL (ref 6–20)
CALCIUM SERPL-MCNC: 8.8 MG/DL (ref 8.7–10.5)
CHLORIDE SERPL-SCNC: 106 MMOL/L (ref 95–110)
CO2 SERPL-SCNC: 23 MMOL/L (ref 23–29)
CREAT SERPL-MCNC: 1.3 MG/DL (ref 0.5–1.4)
ERYTHROCYTE [DISTWIDTH] IN BLOOD BY AUTOMATED COUNT: 12.3 % (ref 11.5–14.5)
GFR SERPLBLD CREATININE-BSD FMLA CKD-EPI: >60 ML/MIN/1.73/M2
GLUCOSE SERPL-MCNC: 123 MG/DL (ref 70–110)
HCT VFR BLD AUTO: 41.8 % (ref 40–54)
HGB BLD-MCNC: 14.4 GM/DL (ref 14–18)
IMM GRANULOCYTES # BLD AUTO: 0.03 K/UL (ref 0–0.04)
IMM GRANULOCYTES NFR BLD AUTO: 0.4 % (ref 0–0.5)
LYMPHOCYTES # BLD AUTO: 1.97 K/UL (ref 1–4.8)
MCH RBC QN AUTO: 31.1 PG (ref 27–31)
MCHC RBC AUTO-ENTMCNC: 34.4 G/DL (ref 32–36)
MCV RBC AUTO: 90 FL (ref 82–98)
NUCLEATED RBC (/100WBC) (SMH): 0 /100 WBC
OHS QRS DURATION: 68 MS
OHS QTC CALCULATION: 414 MS
PLATELET # BLD AUTO: 217 K/UL (ref 150–450)
PMV BLD AUTO: 10.9 FL (ref 9.2–12.9)
POCT GLUCOSE: 125 MG/DL (ref 70–110)
POCT GLUCOSE: 185 MG/DL (ref 70–110)
POCT GLUCOSE: 199 MG/DL (ref 70–110)
POCT GLUCOSE: 221 MG/DL (ref 70–110)
POTASSIUM SERPL-SCNC: 3.9 MMOL/L (ref 3.5–5.1)
PROT SERPL-MCNC: 6.7 GM/DL (ref 6–8.4)
RBC # BLD AUTO: 4.63 M/UL (ref 4.6–6.2)
RELATIVE EOSINOPHIL (SMH): 2 % (ref 0–8)
RELATIVE LYMPHOCYTE (SMH): 25.7 % (ref 18–48)
RELATIVE MONOCYTE (SMH): 8.9 % (ref 4–15)
RELATIVE NEUTROPHIL (SMH): 62.7 % (ref 38–73)
SODIUM SERPL-SCNC: 137 MMOL/L (ref 136–145)
TROPONIN HIGH SENSITIVE (SMH): 8603.3 PG/ML
WBC # BLD AUTO: 7.66 K/UL (ref 3.9–12.7)

## 2025-04-18 PROCEDURE — 21000000 HC CCU ICU ROOM CHARGE

## 2025-04-18 PROCEDURE — 25000003 PHARM REV CODE 250: Performed by: STUDENT IN AN ORGANIZED HEALTH CARE EDUCATION/TRAINING PROGRAM

## 2025-04-18 PROCEDURE — 99900031 HC PATIENT EDUCATION (STAT)

## 2025-04-18 PROCEDURE — 85025 COMPLETE CBC W/AUTO DIFF WBC: CPT | Performed by: STUDENT IN AN ORGANIZED HEALTH CARE EDUCATION/TRAINING PROGRAM

## 2025-04-18 PROCEDURE — 25000003 PHARM REV CODE 250: Performed by: NURSE PRACTITIONER

## 2025-04-18 PROCEDURE — 84484 ASSAY OF TROPONIN QUANT: CPT | Performed by: HOSPITALIST

## 2025-04-18 PROCEDURE — 99900035 HC TECH TIME PER 15 MIN (STAT)

## 2025-04-18 PROCEDURE — 36415 COLL VENOUS BLD VENIPUNCTURE: CPT | Performed by: HOSPITALIST

## 2025-04-18 PROCEDURE — 99233 SBSQ HOSP IP/OBS HIGH 50: CPT | Mod: ,,, | Performed by: INTERNAL MEDICINE

## 2025-04-18 PROCEDURE — 82040 ASSAY OF SERUM ALBUMIN: CPT | Performed by: HOSPITALIST

## 2025-04-18 PROCEDURE — 94761 N-INVAS EAR/PLS OXIMETRY MLT: CPT

## 2025-04-18 PROCEDURE — 82962 GLUCOSE BLOOD TEST: CPT

## 2025-04-18 RX ORDER — NAPROXEN SODIUM 220 MG/1
81 TABLET, FILM COATED ORAL DAILY
Qty: 90 TABLET | Refills: 3 | Status: SHIPPED | OUTPATIENT
Start: 2025-04-19 | End: 2026-04-19

## 2025-04-18 RX ORDER — ATORVASTATIN CALCIUM 80 MG/1
80 TABLET, FILM COATED ORAL DAILY
Qty: 90 TABLET | Refills: 3 | Status: SHIPPED | OUTPATIENT
Start: 2025-04-19 | End: 2026-04-19

## 2025-04-18 RX ORDER — CLOPIDOGREL BISULFATE 75 MG/1
75 TABLET ORAL DAILY
Qty: 90 TABLET | Refills: 3 | Status: SHIPPED | OUTPATIENT
Start: 2025-04-19 | End: 2026-04-19

## 2025-04-18 RX ADMIN — ACETAMINOPHEN 650 MG: 325 TABLET ORAL at 03:04

## 2025-04-18 RX ADMIN — CARVEDILOL 6.25 MG: 6.25 TABLET, FILM COATED ORAL at 04:04

## 2025-04-18 RX ADMIN — ATORVASTATIN CALCIUM 80 MG: 40 TABLET, FILM COATED ORAL at 09:04

## 2025-04-18 RX ADMIN — CARVEDILOL 6.25 MG: 6.25 TABLET, FILM COATED ORAL at 09:04

## 2025-04-18 RX ADMIN — LOSARTAN POTASSIUM 50 MG: 50 TABLET, FILM COATED ORAL at 09:04

## 2025-04-18 RX ADMIN — MUPIROCIN 1 G: 20 OINTMENT TOPICAL at 10:04

## 2025-04-18 RX ADMIN — ASPIRIN 81 MG: 81 TABLET, CHEWABLE ORAL at 09:04

## 2025-04-18 RX ADMIN — CLOPIDOGREL BISULFATE 75 MG: 75 TABLET, FILM COATED ORAL at 09:04

## 2025-04-18 NOTE — HOSPITAL COURSE
Patient was admitted to the hospital medicine service for NSTEMI.  Cardiology was consulted.  He underwent angiogram via right radial access which showed occluded mid LAD which was treated with PCI with 2 overlapping PAULA.  Dual antiplatelet therapy plus statin was recommended.  Coreg and losartan were added for hypertension control.  Cardiology cleared patient to be discharged after overnight observation.  Recommended to follow-up with PCP and Cardiology

## 2025-04-18 NOTE — PLAN OF CARE
Problem: Adult Inpatient Plan of Care  Goal: Plan of Care Review  Outcome: Progressing  Goal: Patient-Specific Goal (Individualized)  Outcome: Progressing  Goal: Absence of Hospital-Acquired Illness or Injury  Outcome: Progressing  Goal: Optimal Comfort and Wellbeing  Outcome: Progressing  Goal: Readiness for Transition of Care  Outcome: Progressing     Problem: Fall Injury Risk  Goal: Absence of Fall and Fall-Related Injury  Outcome: Progressing     Problem: Acute Coronary Syndrome  Goal: Optimal Adaptation to Illness  Outcome: Progressing  Goal: Absence of Cardiac-Related Pain  Outcome: Progressing  Goal: Normalized Cardiac Rhythm  Outcome: Progressing  Goal: Effective Cardiac Pump Function  Outcome: Progressing  Goal: Adequate Tissue Perfusion  Outcome: Progressing     Problem: Cardiac Catheterization (Diagnostic/Interventional)  Goal: Absence of Bleeding  Outcome: Progressing  Goal: Absence of Contrast-Induced Injury  Outcome: Progressing  Goal: Stable Heart Rate and Rhythm  Outcome: Progressing  Goal: Absence of Embolism Signs and Symptoms  Outcome: Progressing  Goal: Anesthesia/Sedation Recovery  Outcome: Progressing  Goal: Optimal Pain Control and Function  Outcome: Progressing  Goal: Absence of Vascular Access Complication  Outcome: Progressing     Problem: Diabetes Comorbidity  Goal: Blood Glucose Level Within Targeted Range  Outcome: Progressing     Problem: Wound  Goal: Optimal Coping  Outcome: Progressing  Goal: Optimal Functional Ability  Outcome: Progressing  Goal: Absence of Infection Signs and Symptoms  Outcome: Progressing  Goal: Improved Oral Intake  Outcome: Progressing  Goal: Optimal Pain Control and Function  Outcome: Progressing  Goal: Skin Health and Integrity  Outcome: Progressing  Goal: Optimal Wound Healing  Outcome: Progressing

## 2025-04-18 NOTE — PROGRESS NOTES
[facility]  Progress Note    Patient Name: Janae Gill  MRN: [id number]  Patient Class: IP- Inpatient   Admission Date: 4/16/2025  Length of Stay: 2 days  Attending Physician: Gaviota Tirado MD  Primary Care Provider: Juany Malik MD        Subjective     Principal Problem:NSTEMI (non-ST elevated myocardial infarction)        HPI:  49 year old male with comorbid conditions of obesity, h/o gastric bypass, type 2 diabete (off medications), hypertension (off medications), hyperlipidemia presented to [facility] due to intermittent chest pain for 4-5 days.  Pain is midsternal, radiating to right jaw and back between shoulder blades, worse with exertion, associated with shortness of breath.  Denies fevers, nausea/vomiting, diarrhea, diaphoresis.  Pain in ED was rated 6/10 which improved with nitroglycerin.    Patient has family history of CAD before 50 on mother's side.    Labs revealed elevated troponin to 460, EKG with normal sinus rhtyhm, nonspecific t-wave changes. Patient was started on heparin infusion and transferred to [facility] for cardiology evaluation and ICU monitoring.         Overview/Hospital Course:  Patient was admitted to the hospital medicine service for NSTEMI.  Cardiology was consulted.  He underwent angiogram via right radial access which showed occluded mid LAD which was treated with PCI with 2 overlapping PAULA.  Dual antiplatelet therapy plus statin was recommended.  Coreg and losartan were added for hypertension control.  Cardiology planning to monitor patient overnight with likely discharge tomorrow.    Interval History:  Patient seen and examined.  No complaints today.  Denies pain or shortness a breath.  He is status post PCI with 2 overlapping PAULA placed yesterday.  Discussed with Cardiology, monitor overnight with plan for discharge home tomorrow.  I sent to aspirin, Plavix, statin to the pharmacy per their request.    Review of Systems   Cardiovascular:  Negative for  chest pain.   Musculoskeletal:  Negative for back pain.   Neurological:  Negative for headaches.     Objective:     Vital Signs (Most Recent):  Temp: 98.2 °F (36.8 °C) (04/18/25 0701)  Pulse: 83 (04/18/25 0934)  Resp: 19 (04/18/25 0934)  BP: (!) 157/88 (04/18/25 0934)  SpO2: 95 % (04/18/25 0934) Vital Signs (24h Range):  Temp:  [98.2 °F (36.8 °C)-100.1 °F (37.8 °C)] 98.2 °F (36.8 °C)  Pulse:  [73-98] 83  Resp:  [13-30] 19  SpO2:  [94 %-99 %] 95 %  BP: (116-190)/() 157/88     Weight: 121 kg (266 lb 12.1 oz)  Body mass index is 40.56 kg/m².    Intake/Output Summary (Last 24 hours) at 4/18/2025 1053  Last data filed at 4/18/2025 0705  Gross per 24 hour   Intake 332.23 ml   Output 300 ml   Net 32.23 ml         Physical Exam  Constitutional:       General: He is not in acute distress.     Appearance: He is well-developed.   HENT:      Head: Normocephalic and atraumatic.   Eyes:      Conjunctiva/sclera: Conjunctivae normal.   Cardiovascular:      Rate and Rhythm: Normal rate and regular rhythm.      Heart sounds: No murmur heard.  Pulmonary:      Effort: Pulmonary effort is normal. No respiratory distress.      Breath sounds: Normal breath sounds. No wheezing or rales.   Abdominal:      General: Bowel sounds are normal. There is no distension.      Palpations: Abdomen is soft.      Tenderness: There is no abdominal tenderness.   Musculoskeletal:         General: Normal range of motion.   Skin:     General: Skin is warm and dry.      Findings: No rash.   Neurological:      Mental Status: He is alert and oriented to person, place, and time.      Cranial Nerves: No cranial nerve deficit.   Psychiatric:         Behavior: Behavior normal.               Significant Labs: All pertinent labs within the past 24 hours have been reviewed.    Significant Imaging: I have reviewed all pertinent imaging results/findings within the past 24 hours.      Assessment & Plan  NSTEMI (non-ST elevated myocardial infarction)  Patient  presented with chest pain and has elevated troponin on blood work.  Troponin 482 >1802, EKG without st elevations.      Status post PCI with placement of 2 overlapping PAULA  Aspirin and Plavix plus statin    Hypertension  Patient's blood pressure range in the last 24 hours was: BP  Min: 116/61  Max: 190/108.The patient's inpatient anti-hypertensive regimen is listed below:  Current Antihypertensives  nitroGLYCERIN SL tablet 0.4 mg, Every 5 min PRN, Sublingual  labetaloL injection 10 mg, Every 6 hours PRN, Intravenous  losartan tablet 50 mg, Daily, Oral  carvediloL tablet 6.25 mg, 2 times daily with meals, Oral    Plan  - BP is elevated.  He has been started on losartan and Coreg per Cardiology.  - PRN labetalol   Type 2 diabetes mellitus  Patient's FSGs are controlled on current medication regimen.  Last A1c reviewed-   Lab Results   Component Value Date    HGBA1C 6.0 04/16/2025     Most recent fingerstick glucose reviewed-   Recent Labs   Lab 04/17/25  1602 04/17/25  1941 04/18/25  0757   POCTGLUCOSE 165* 159* 125*     Current correctional scale  Low  Maintain anti-hyperglycemic dose as follows-   Antihyperglycemics (From admission, onward)      Start     Stop Route Frequency Ordered    04/16/25 2350  insulin aspart U-100 pen 0-5 Units         -- SubQ Before meals & nightly PRN 04/16/25 2251          Hold Oral hypoglycemics while patient is in the hospital.  Patient reports being off antihyperglycemic medications and does not take insulin   History of gastric bypass  Noted    VTE Risk Mitigation (From admission, onward)           Ordered     Reason for No Pharmacological VTE Prophylaxis  Once        Question:  Reasons:  Answer:  IV Heparin w/in 24 hrs. Pre or Post-Op    04/16/25 2232     Place sequential compression device  Until discontinued         04/16/25 2232     IP VTE HIGH RISK PATIENT  Once         04/16/25 2227     Place sequential compression device  Until discontinued         04/16/25 2227     heparin  25,000 units in dextrose 5% (100 units/ml) IV bolus from bag LOW INTENSITY nomogram - [facility]  As needed (PRN)        Question:  Heparin Infusion Adjustment (DO NOT MODIFY ANSWER)  Answer:  [email or web address]\epic\Images\Pharmacy\HeparinInfusions\heparin LOW INTENSITY nomogram for [facility] [id number].pdf    04/16/25 1831     heparin 25,000 units in dextrose 5% (100 units/ml) IV bolus from bag LOW INTENSITY nomogram - [facility]  As needed (PRN)        Question:  Heparin Infusion Adjustment (DO NOT MODIFY ANSWER)  Answer:  [email or web address]\epic\Images\Pharmacy\HeparinInfusions\heparin LOW INTENSITY nomogram for [facility] [id number].pdf    04/16/25 1831     heparin 25,000 units in dextrose 5% 250 mL (100 units/mL) infusion LOW INTENSITY nomogram - [facility]  Continuous        Question:  Begin at (units/kg/hr)  Answer:  12    04/16/25 1831                    Discharge Planning   NIKKI: 4/20/2025     Code Status: Full Code   Medical Readiness for Discharge Date:   Discharge Plan A: Home with family                        Gaviota Tirado MD  Department of Hospital Medicine   [facility]

## 2025-04-18 NOTE — PLAN OF CARE
Problem: Adult Inpatient Plan of Care  Goal: Plan of Care Review  Outcome: Progressing  Goal: Patient-Specific Goal (Individualized)  Outcome: Progressing  Goal: Absence of Hospital-Acquired Illness or Injury  Outcome: Progressing  Goal: Optimal Comfort and Wellbeing  Outcome: Progressing  Goal: Readiness for Transition of Care  Outcome: Progressing     Problem: Bariatric Environmental Safety  Goal: Safety Maintained with Care  Outcome: Progressing     Problem: Fall Injury Risk  Goal: Absence of Fall and Fall-Related Injury  Outcome: Progressing     Problem: Acute Coronary Syndrome  Goal: Optimal Adaptation to Illness  Outcome: Progressing  Goal: Absence of Cardiac-Related Pain  Outcome: Progressing  Goal: Normalized Cardiac Rhythm  Outcome: Progressing  Goal: Effective Cardiac Pump Function  Outcome: Progressing  Goal: Adequate Tissue Perfusion  Outcome: Progressing     Problem: Cardiac Catheterization (Diagnostic/Interventional)  Goal: Absence of Bleeding  Outcome: Progressing  Goal: Absence of Contrast-Induced Injury  Outcome: Progressing  Goal: Stable Heart Rate and Rhythm  Outcome: Progressing  Goal: Absence of Embolism Signs and Symptoms  Outcome: Progressing  Goal: Anesthesia/Sedation Recovery  Outcome: Progressing  Goal: Optimal Pain Control and Function  Outcome: Progressing  Goal: Absence of Vascular Access Complication  Outcome: Progressing     Problem: Diabetes Comorbidity  Goal: Blood Glucose Level Within Targeted Range  Outcome: Progressing     Problem: Wound  Goal: Optimal Coping  Outcome: Progressing  Goal: Optimal Functional Ability  Outcome: Progressing  Goal: Absence of Infection Signs and Symptoms  Outcome: Progressing  Goal: Improved Oral Intake  Outcome: Progressing  Goal: Optimal Pain Control and Function  Outcome: Progressing  Goal: Skin Health and Integrity  Outcome: Progressing  Goal: Optimal Wound Healing  Outcome: Progressing      ambulate

## 2025-04-18 NOTE — ASSESSMENT & PLAN NOTE
Patient's blood pressure range in the last 24 hours was: BP  Min: 116/61  Max: 190/108.The patient's inpatient anti-hypertensive regimen is listed below:  Current Antihypertensives  nitroGLYCERIN SL tablet 0.4 mg, Every 5 min PRN, Sublingual  labetaloL injection 10 mg, Every 6 hours PRN, Intravenous  losartan tablet 50 mg, Daily, Oral  carvediloL tablet 6.25 mg, 2 times daily with meals, Oral    Plan  - BP is elevated.  He has been started on losartan and Coreg per Cardiology.  - PRN labetalol

## 2025-04-18 NOTE — CARE UPDATE
04/18/25 0800   Patient Assessment/Suction   Level of Consciousness (AVPU) alert   Respiratory Effort Normal;Unlabored   Expansion/Accessory Muscles/Retractions no use of accessory muscles;no retractions;expansion symmetric   Rhythm/Pattern, Respiratory no shortness of breath reported;depth regular;pattern regular;unlabored   Cough Frequency no cough   PRE-TX-O2   Device (Oxygen Therapy) room air   SpO2 96 %   Pulse Oximetry Type Continuous   $ Pulse Oximetry - Multiple Charge Pulse Oximetry - Multiple   Pulse 76   Resp 16   /80   Positioning HOB elevated 30 degrees   Aerosol Therapy   $ Aerosol Therapy Charges PRN treatment not required  (STATED)

## 2025-04-18 NOTE — SUBJECTIVE & OBJECTIVE
Interval History:  Patient seen and examined.  No complaints today.  Denies pain or shortness a breath.  He is status post PCI with 2 overlapping PAULA placed yesterday.  Discussed with Cardiology, monitor overnight with plan for discharge home tomorrow.  I sent to aspirin, Plavix, statin to the pharmacy per their request.    Review of Systems   Cardiovascular:  Negative for chest pain.   Musculoskeletal:  Negative for back pain.   Neurological:  Negative for headaches.     Objective:     Vital Signs (Most Recent):  Temp: 98.2 °F (36.8 °C) (04/18/25 0701)  Pulse: 83 (04/18/25 0934)  Resp: 19 (04/18/25 0934)  BP: (!) 157/88 (04/18/25 0934)  SpO2: 95 % (04/18/25 0934) Vital Signs (24h Range):  Temp:  [98.2 °F (36.8 °C)-100.1 °F (37.8 °C)] 98.2 °F (36.8 °C)  Pulse:  [73-98] 83  Resp:  [13-30] 19  SpO2:  [94 %-99 %] 95 %  BP: (116-190)/() 157/88     Weight: 121 kg (266 lb 12.1 oz)  Body mass index is 40.56 kg/m².    Intake/Output Summary (Last 24 hours) at 4/18/2025 1053  Last data filed at 4/18/2025 0705  Gross per 24 hour   Intake 332.23 ml   Output 300 ml   Net 32.23 ml         Physical Exam  Constitutional:       General: He is not in acute distress.     Appearance: He is well-developed.   HENT:      Head: Normocephalic and atraumatic.   Eyes:      Conjunctiva/sclera: Conjunctivae normal.   Cardiovascular:      Rate and Rhythm: Normal rate and regular rhythm.      Heart sounds: No murmur heard.  Pulmonary:      Effort: Pulmonary effort is normal. No respiratory distress.      Breath sounds: Normal breath sounds. No wheezing or rales.   Abdominal:      General: Bowel sounds are normal. There is no distension.      Palpations: Abdomen is soft.      Tenderness: There is no abdominal tenderness.   Musculoskeletal:         General: Normal range of motion.   Skin:     General: Skin is warm and dry.      Findings: No rash.   Neurological:      Mental Status: He is alert and oriented to person, place, and time.       Cranial Nerves: No cranial nerve deficit.   Psychiatric:         Behavior: Behavior normal.               Significant Labs: All pertinent labs within the past 24 hours have been reviewed.    Significant Imaging: I have reviewed all pertinent imaging results/findings within the past 24 hours.

## 2025-04-18 NOTE — ASSESSMENT & PLAN NOTE
Patient presented with chest pain and has elevated troponin on blood work.  Troponin 482 >1802, EKG without st elevations.      Status post PCI with placement of 2 overlapping PAULA  Aspirin and Plavix plus statin

## 2025-04-18 NOTE — PROGRESS NOTES
[facility]  Department of Cardiology  Consult Note      PATIENT NAME: Janae Gill    MRN: [id number]  TODAY'S DATE: 04/18/2025  ADMIT DATE: 4/16/2025                          CONSULT REQUESTED BY: Gaviota Tirado MD    SUBJECTIVE     PRINCIPAL PROBLEM: NSTEMI (non-ST elevated myocardial infarction)      04/18/2025  Patient seen resting in bed with no acute distress noted.  Denies any chest discomfort or shortness of breath.  He states he is feeling well overall.  Patient does voice his surprised that he has had a heart attack since over the past year he has lost a significant amount of weight, has changed his diet, and is exercising.  However he does have a family history of CAD as well as a long history of morbid obesity in the past.    4/17/25  49-year-old male with past medical history of diabetes and hypertension admitted with chest discomfort described as tightness. Reports having mild chest tightness currently. Blood pressure elevated. Troponins elevated. EKG shows biphasic T-waves in lateral leads. Echo shows possible subtle apical wall motion abnormality.       Review of patient's allergies indicates:   Allergen Reactions    Codeine        Past Medical History:   Diagnosis Date    Asthma     Diabetes mellitus     Hypertension     Obesity      History reviewed. No pertinent surgical history.  Social History[1]     REVIEW OF SYSTEMS  Per HPI    OBJECTIVE     VITAL SIGNS (Most Recent)  Temp: 98.2 °F (36.8 °C) (04/18/25 0701)  Pulse: 83 (04/18/25 0934)  Resp: 19 (04/18/25 0934)  BP: (!) 157/88 (04/18/25 0934)  SpO2: 95 % (04/18/25 0934)    VENTILATION STATUS  Resp: 19 (04/18/25 0934)  SpO2: 95 % (04/18/25 0934)           I & O (Last 24H):  Intake/Output Summary (Last 24 hours) at 4/18/2025 1042  Last data filed at 4/18/2025 0705  Gross per 24 hour   Intake 332.23 ml   Output 300 ml   Net 32.23 ml       WEIGHTS  Wt Readings from Last 1 Encounters:   04/17/25 0045 121 kg (266 lb 12.1 oz)   04/16/25  2115 121 kg (266 lb 12.1 oz)   04/16/25 1549 121 kg (266 lb 12.1 oz)       PHYSICAL EXAM  CONSTITUTIONAL: No fever, no chills  HEENT: Normocephalic, atraumatic,pupils reactive to light                 NECK:  No JVD no carotid bruit  CVS: S1S2+, RRR  LUNGS: Clear  ABDOMEN: Soft, NT, BS+  EXTREMITIES: No cyanosis, edema  : No carreno catheter  NEURO: AAO X 3  PSY: Normal affect      HOME MEDICATIONS:Medications Ordered Prior to Encounter[2]    SCHEDULED MEDS:   aspirin  81 mg Oral Daily    atorvastatin  80 mg Oral Daily    carvediloL  6.25 mg Oral BID WM    clopidogreL  75 mg Oral Daily    losartan  50 mg Oral Daily    mupirocin   Nasal BID       CONTINUOUS INFUSIONS:   0.9% NaCl   Intravenous Continuous 100 mL/hr at 04/17/25 1253 New Bag at 04/17/25 1253    heparin (porcine) in D5W  0-40 Units/kg/hr (Adjusted) Intravenous Continuous   Stopped at 04/17/25 0856       PRN MEDS:  Current Facility-Administered Medications:     acetaminophen, 650 mg, Oral, Q4H PRN    albuterol-ipratropium, 3 mL, Nebulization, Q6H PRN    dextrose 50%, 12.5 g, Intravenous, PRN    dextrose 50%, 25 g, Intravenous, PRN    glucagon (human recombinant), 1 mg, Intramuscular, PRN    glucose, 16 g, Oral, PRN    glucose, 24 g, Oral, PRN    heparin (PORCINE), 44.7 Units/kg (Adjusted), Intravenous, PRN    heparin (PORCINE), 30 Units/kg (Adjusted), Intravenous, PRN    insulin aspart U-100, 0-5 Units, Subcutaneous, QID (AC + HS) PRN    labetalol, 10 mg, Intravenous, Q6H PRN    magnesium oxide, 800 mg, Oral, PRN    magnesium oxide, 800 mg, Oral, PRN    melatonin, 6 mg, Oral, Nightly PRN    morphine, 2 mg, Intravenous, Q4H PRN    naloxone, 0.02 mg, Intravenous, PRN    nitroGLYCERIN, 0.4 mg, Sublingual, Q5 Min PRN    ondansetron, 4 mg, Intravenous, Q12H PRN    potassium bicarbonate, 35 mEq, Oral, PRN    potassium bicarbonate, 50 mEq, Oral, PRN    potassium bicarbonate, 60 mEq, Oral, PRN    potassium, sodium phosphates, 2 packet, Oral, PRN    potassium,  "sodium phosphates, 2 packet, Oral, PRN    potassium, sodium phosphates, 2 packet, Oral, PRN    senna-docusate, 1 tablet, Oral, BID PRN    sodium chloride 0.9%, 10 mL, Intravenous, PRN    LABS AND DIAGNOSTICS     CBC LAST 3 DAYS  Recent Labs   Lab 04/16/25  1653 04/17/25  0300 04/18/25  0509   WBC 6.94 9.06 7.66   RBC 4.37* 4.68 4.63   HGB 13.2* 13.8* 14.4   HCT 40.0 42.5 41.8   MCV 92 91 90   MCH 30.2 29.5 31.1*   MCHC 33.0 32.5 34.4   RDW 12.1 12.4 12.3    218 217   MPV 10.2 10.7 10.9   NRBC 0 0 0       COAGULATION LAST 3 DAYS  Recent Labs   Lab 04/16/25  1653   INR 1.0       CHEMISTRY LAST 3 DAYS  Recent Labs   Lab 04/16/25  1653 04/18/25  0509    137   K 4.6 3.9   CO2 25 23   BUN 14 16   CREATININE 1.4 1.3   CALCIUM 8.7 8.8   MG 1.9  --    ALBUMIN 3.7 3.6   ALKPHOS 100 86   ALT 13 15   AST 27 37   BILITOT 0.3 0.6       CARDIAC PROFILE LAST 3 DAYS  Recent Labs   Lab 04/16/25  1653 04/16/25  2042   *  --    TROPONINIHS 482* 1,802*       ENDOCRINE LAST 3 DAYS  No results for input(s): "TSH", "PROCAL" in the last 168 hours.    LAST ARTERIAL BLOOD GAS  ABG  No results for input(s): "PH", "PO2", "PCO2", "HCO3", "BE" in the last 168 hours.    LAST 7 DAYS MICROBIOLOGY   Microbiology Results (last 7 days)       ** No results found for the last 168 hours. **            MOST RECENT IMAGING  Cardiac catheterization    The estimated blood loss was <50 mL.    The pre-procedure left ventricular end diastolic pressure was 11.    The Mid LAD lesion was 100% stenosed with 10% stenosis   post-intervention.    Mid LAD lesion: A STENT SYNERGY XD 3.0X32MM stent was successfully   placed at 11 MALGORZATA for 15 sec. stents post dilated with 3.75 NC    Mid LAD lesion: A STENT SYNERGY XD 4.0X28MM stent was successfully   placed at 11 MALGORZATA for 15 sec. stent post dilated with 4 5 NC proximally.    Culprit lesion was mid %, successfully treated with IVUS guided   PCI with 2 overlapping drug-eluting stents.    Mild diffuse " disease of ramus, left circumflex and right coronary   artery.    The procedure log was documented by Documenter: Miguelina Kinney RN and   verified by Javon Sharma MD.    Date: 4/18/2025  Time: 8:25 AM      ECHOCARDIOGRAM RESULTS (last 5)  Results for orders placed during the hospital encounter of 04/16/25    Echo    Interpretation Summary    Left Ventricle: Left ventricle was not well visualized due to poor sonic window. The left ventricle is normal in size. Moderately increased wall thickness. There is concentric hypertrophy. There is normal systolic function with a visually estimated ejection fraction of 55 - 60%.    Pericardium: There is no pericardial effusion.    Limited for effusion only.      Echo    Interpretation Summary    Left Ventricle: Left ventricle was not well visualized due to poor sonic window. The left ventricle is normal in size. Moderately increased wall thickness. There is concentric hypertrophy. There is mildly reduced systolic function with a visually estimated ejection fraction of 45 - 50%. Grade I diastolic dysfunction. E/A ratio is 0.52.    Right Ventricle: The right ventricle is normal in size. Systolic function is normal.    Aortic Valve: The aortic valve is a trileaflet valve. There is mild aortic valve sclerosis.    Tricuspid Valve: There is mild regurgitation. The estimated PA systolic pressure is at least 22 mmHg.    Pulmonic Valve: Not well visualized due to poor acoustic window.      CURRENT/PREVIOUS VISIT EKG  Results for orders placed or performed during the hospital encounter of 04/16/25   EKG 12-lead    Collection Time: 04/17/25 12:34 PM   Result Value Ref Range    QRS Duration 68 ms    OHS QTC Calculation 449 ms    Narrative    Test Reason : I21.4,    Vent. Rate :  84 BPM     Atrial Rate :  84 BPM     P-R Int : 136 ms          QRS Dur :  68 ms      QT Int : 380 ms       P-R-T Axes :  69  50 119 degrees    QTcB Int : 449 ms    Normal sinus rhythm  Possible Left atrial  enlargement  T wave abnormality, consider lateral ischemia  Abnormal ECG  When compared with ECG of 17-Apr-2025 11:14,  Premature atrial complexes are no longer Present  Confirmed by Dandre Florence ([id number]) on 4/17/2025 9:53:39 PM    Referred By: SHAUNA SELF           Confirmed By: Dandre Florence           ASSESSMENT/PLAN:     Active Hospital Problems    Diagnosis    *NSTEMI (non-ST elevated myocardial infarction)    Hypertension    Type 2 diabetes mellitus    History of gastric bypass       ASSESSMENT & PLAN:     NSTEMI  CAD s/p PCI to LAD  HTN  Type II DM  Hx of gastric bypass  Morbid obesity       RECOMMENDATIONS:    Advised patient the need to continue Plavix 75 mg po daily and aspirin 81 mg po daily without interruption for minimum of 12 months post PCI.  Should the patient develop any bleeding issues or have any falls with head injury should report to the nearest emergency room for evaluation.  Requested hospitalist to go ahead and send antiplatelet medications and statin to the local pharmacy to ensure this can be picked up prior to discharge.  Recommend monitoring patient in the hospital for another 24 hours and discharge home if he is doing well.  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  Ideally he may need to take some time off in order to do cardiac rehab and allow his body to heal.  Patient does have a primary cardiologist in [place] and may follow-up with her.  We have advised on the importance of post hospitalization follow-up with Cardiology.  Continue carvedilol 6.25 mg p.o. b.i.d. and losartan 50 mg p.o. daily.  Up titrate medications as needed for blood pressure control.        Charleen Kumar NP  Date of Service: 04/18/2025  10:42 AM       [1]   Social History  Tobacco Use    Smoking status: Never   Substance Use Topics    Alcohol use: No    Drug use: No   [2]   No current  facility-administered medications on file prior to encounter.     Current Outpatient Medications on File Prior to Encounter   Medication Sig Dispense Refill    CIALIS 20 mg Tab Take 20 mg by mouth daily as needed (ED).      VITAMIN D3 10 mcg (400 unit) tablet Take 1 tablet by mouth once daily.      budesonide-formoterol 80-4.5 mcg (SYMBICORT) 80-4.5 mcg/actuation HFAA Symbicort 80-4.5 MCG/ACT Inhalation Aerosol QTY: 10.2 gram Days: 30 Refills: 1  Written: 02/17/23 Patient Instructions: inhale 2 puff twice a day (Patient not taking: Reported on 4/16/2025)

## 2025-04-19 VITALS
HEIGHT: 68 IN | TEMPERATURE: 98 F | BODY MASS INDEX: 40.43 KG/M2 | DIASTOLIC BLOOD PRESSURE: 80 MMHG | WEIGHT: 266.75 LBS | HEART RATE: 74 BPM | SYSTOLIC BLOOD PRESSURE: 127 MMHG | OXYGEN SATURATION: 96 % | RESPIRATION RATE: 16 BRPM

## 2025-04-19 LAB
ALBUMIN SERPL-MCNC: 3.7 G/DL (ref 3.5–5.2)
ALP SERPL-CCNC: 83 UNIT/L (ref 55–135)
ALT SERPL-CCNC: 14 UNIT/L (ref 10–44)
ANION GAP (SMH): 8 MMOL/L (ref 8–16)
AST SERPL-CCNC: 24 UNIT/L (ref 10–40)
BILIRUB SERPL-MCNC: 0.6 MG/DL (ref 0.1–1)
BUN SERPL-MCNC: 22 MG/DL (ref 6–20)
CALCIUM SERPL-MCNC: 9 MG/DL (ref 8.7–10.5)
CHLORIDE SERPL-SCNC: 105 MMOL/L (ref 95–110)
CO2 SERPL-SCNC: 24 MMOL/L (ref 23–29)
CREAT SERPL-MCNC: 1.5 MG/DL (ref 0.5–1.4)
GFR SERPLBLD CREATININE-BSD FMLA CKD-EPI: 57 ML/MIN/1.73/M2
GLUCOSE SERPL-MCNC: 149 MG/DL (ref 70–110)
OHS QRS DURATION: 68 MS
OHS QRS DURATION: 68 MS
OHS QTC CALCULATION: 435 MS
OHS QTC CALCULATION: 449 MS
POCT GLUCOSE: 121 MG/DL (ref 70–110)
POCT GLUCOSE: 182 MG/DL (ref 70–110)
POTASSIUM SERPL-SCNC: 4 MMOL/L (ref 3.5–5.1)
PROT SERPL-MCNC: 6.8 GM/DL (ref 6–8.4)
SODIUM SERPL-SCNC: 137 MMOL/L (ref 136–145)

## 2025-04-19 PROCEDURE — 36415 COLL VENOUS BLD VENIPUNCTURE: CPT | Performed by: HOSPITALIST

## 2025-04-19 PROCEDURE — 99233 SBSQ HOSP IP/OBS HIGH 50: CPT | Mod: ,,, | Performed by: INTERNAL MEDICINE

## 2025-04-19 PROCEDURE — 94761 N-INVAS EAR/PLS OXIMETRY MLT: CPT

## 2025-04-19 PROCEDURE — 99900031 HC PATIENT EDUCATION (STAT)

## 2025-04-19 PROCEDURE — 25000003 PHARM REV CODE 250: Performed by: STUDENT IN AN ORGANIZED HEALTH CARE EDUCATION/TRAINING PROGRAM

## 2025-04-19 PROCEDURE — 25000003 PHARM REV CODE 250: Performed by: NURSE PRACTITIONER

## 2025-04-19 PROCEDURE — 99900035 HC TECH TIME PER 15 MIN (STAT)

## 2025-04-19 PROCEDURE — 63600175 PHARM REV CODE 636 W HCPCS: Performed by: STUDENT IN AN ORGANIZED HEALTH CARE EDUCATION/TRAINING PROGRAM

## 2025-04-19 PROCEDURE — 80053 COMPREHEN METABOLIC PANEL: CPT | Performed by: HOSPITALIST

## 2025-04-19 RX ORDER — CARVEDILOL 12.5 MG/1
12.5 TABLET ORAL 2 TIMES DAILY WITH MEALS
Status: DISCONTINUED | OUTPATIENT
Start: 2025-04-19 | End: 2025-04-19 | Stop reason: HOSPADM

## 2025-04-19 RX ORDER — CARVEDILOL 12.5 MG/1
12.5 TABLET ORAL 2 TIMES DAILY WITH MEALS
Qty: 60 TABLET | Refills: 1 | Status: SHIPPED | OUTPATIENT
Start: 2025-04-19 | End: 2025-05-19

## 2025-04-19 RX ORDER — LOSARTAN POTASSIUM 50 MG/1
50 TABLET ORAL DAILY
Qty: 30 TABLET | Refills: 1 | Status: SHIPPED | OUTPATIENT
Start: 2025-04-20 | End: 2025-05-20

## 2025-04-19 RX ADMIN — LABETALOL HYDROCHLORIDE 10 MG: 5 INJECTION, SOLUTION INTRAVENOUS at 09:04

## 2025-04-19 RX ADMIN — MUPIROCIN 1 G: 20 OINTMENT TOPICAL at 09:04

## 2025-04-19 RX ADMIN — CLOPIDOGREL BISULFATE 75 MG: 75 TABLET, FILM COATED ORAL at 09:04

## 2025-04-19 RX ADMIN — CARVEDILOL 6.25 MG: 6.25 TABLET, FILM COATED ORAL at 09:04

## 2025-04-19 RX ADMIN — ATORVASTATIN CALCIUM 80 MG: 40 TABLET, FILM COATED ORAL at 09:04

## 2025-04-19 RX ADMIN — LOSARTAN POTASSIUM 50 MG: 50 TABLET, FILM COATED ORAL at 09:04

## 2025-04-19 RX ADMIN — ASPIRIN 81 MG: 81 TABLET, CHEWABLE ORAL at 09:04

## 2025-04-19 NOTE — PROGRESS NOTES
[facility]  Department of Cardiology  Progress Note      PATIENT NAME: Janae Gill    MRN: [id number]  TODAY'S DATE: 04/19/2025  ADMIT DATE: 4/16/2025                          CONSULT REQUESTED BY: Mario Grayson DO    SUBJECTIVE     PRINCIPAL PROBLEM: NSTEMI (non-ST elevated myocardial infarction)      04/19/2025  Patient seen resting in bed with no acute distress noted.  Breathing stable.  No complaints of chest pain.  Patient hypertensive today.    4/18/25  Patient seen resting in bed with no acute distress noted.  Denies any chest discomfort or shortness of breath.  He states he is feeling well overall.  Patient does voice his surprised that he has had a heart attack since over the past year he has lost a significant amount of weight, has changed his diet, and is exercising.  However he does have a family history of CAD as well as a long history of morbid obesity in the past.    4/17/25  49-year-old male with past medical history of diabetes and hypertension admitted with chest discomfort described as tightness. Reports having mild chest tightness currently. Blood pressure elevated. Troponins elevated. EKG shows biphasic T-waves in lateral leads. Echo shows possible subtle apical wall motion abnormality.       Review of patient's allergies indicates:   Allergen Reactions    Codeine        Past Medical History:   Diagnosis Date    Asthma     Diabetes mellitus     Hypertension     Obesity      History reviewed. No pertinent surgical history.  Social History[1]     REVIEW OF SYSTEMS  Per HPI    OBJECTIVE     VITAL SIGNS (Most Recent)  Temp: 98.1 °F (36.7 °C) (04/19/25 1100)  Pulse: 75 (04/19/25 1100)  Resp: 20 (04/19/25 1100)  BP: 121/80 (04/19/25 1100)  SpO2: 96 % (04/19/25 1100)    VENTILATION STATUS  Resp: 20 (04/19/25 1100)  SpO2: 96 % (04/19/25 1100)           I & O (Last 24H):  Intake/Output Summary (Last 24 hours) at 4/19/2025 1316  Last data filed at 4/19/2025 1100  Gross per 24 hour   Intake  1300 ml   Output --   Net 1300 ml       WEIGHTS  Wt Readings from Last 1 Encounters:   04/17/25 0045 121 kg (266 lb 12.1 oz)   04/16/25 2115 121 kg (266 lb 12.1 oz)   04/16/25 1549 121 kg (266 lb 12.1 oz)       PHYSICAL EXAM  CONSTITUTIONAL: No fever, no chills  HEENT: Normocephalic, atraumatic,pupils reactive to light                 NECK:  No JVD no carotid bruit  CVS: S1S2+, RRR  LUNGS: Clear  ABDOMEN: Soft, NT, BS+  EXTREMITIES: No cyanosis, edema  : No carreno catheter  NEURO: AAO X 3  PSY: Normal affect      HOME MEDICATIONS:Medications Ordered Prior to Encounter[2]    SCHEDULED MEDS:   aspirin  81 mg Oral Daily    atorvastatin  80 mg Oral Daily    carvediloL  12.5 mg Oral BID WM    clopidogreL  75 mg Oral Daily    losartan  50 mg Oral Daily    mupirocin   Nasal BID       CONTINUOUS INFUSIONS:        PRN MEDS:  Current Facility-Administered Medications:     acetaminophen, 650 mg, Oral, Q4H PRN    albuterol-ipratropium, 3 mL, Nebulization, Q6H PRN    dextrose 50%, 12.5 g, Intravenous, PRN    dextrose 50%, 25 g, Intravenous, PRN    glucagon (human recombinant), 1 mg, Intramuscular, PRN    glucose, 16 g, Oral, PRN    glucose, 24 g, Oral, PRN    insulin aspart U-100, 0-5 Units, Subcutaneous, QID (AC + HS) PRN    labetalol, 10 mg, Intravenous, Q6H PRN    magnesium oxide, 800 mg, Oral, PRN    magnesium oxide, 800 mg, Oral, PRN    melatonin, 6 mg, Oral, Nightly PRN    morphine, 2 mg, Intravenous, Q4H PRN    naloxone, 0.02 mg, Intravenous, PRN    nitroGLYCERIN, 0.4 mg, Sublingual, Q5 Min PRN    ondansetron, 4 mg, Intravenous, Q12H PRN    potassium bicarbonate, 35 mEq, Oral, PRN    potassium bicarbonate, 50 mEq, Oral, PRN    potassium bicarbonate, 60 mEq, Oral, PRN    potassium, sodium phosphates, 2 packet, Oral, PRN    potassium, sodium phosphates, 2 packet, Oral, PRN    potassium, sodium phosphates, 2 packet, Oral, PRN    senna-docusate, 1 tablet, Oral, BID PRN    sodium chloride 0.9%, 10 mL, Intravenous, PRN    LABS  "AND DIAGNOSTICS     CBC LAST 3 DAYS  Recent Labs   Lab 04/16/25  1653 04/17/25  0300 04/18/25  0509   WBC 6.94 9.06 7.66   RBC 4.37* 4.68 4.63   HGB 13.2* 13.8* 14.4   HCT 40.0 42.5 41.8   MCV 92 91 90   MCH 30.2 29.5 31.1*   MCHC 33.0 32.5 34.4   RDW 12.1 12.4 12.3    218 217   MPV 10.2 10.7 10.9   NRBC 0 0 0       COAGULATION LAST 3 DAYS  Recent Labs   Lab 04/16/25  1653   INR 1.0       CHEMISTRY LAST 3 DAYS  Recent Labs   Lab 04/16/25  1653 04/18/25  0509 04/19/25  0413    137 137   K 4.6 3.9 4.0   CO2 25 23 24   BUN 14 16 22*   CREATININE 1.4 1.3 1.5*   CALCIUM 8.7 8.8 9.0   MG 1.9  --   --    ALBUMIN 3.7 3.6 3.7   ALKPHOS 100 86 83   ALT 13 15 14   AST 27 37 24   BILITOT 0.3 0.6 0.6       CARDIAC PROFILE LAST 3 DAYS  Recent Labs   Lab 04/16/25  1653 04/16/25  2042   *  --    TROPONINIHS 482* 1,802*       ENDOCRINE LAST 3 DAYS  No results for input(s): "TSH", "PROCAL" in the last 168 hours.    LAST ARTERIAL BLOOD GAS  ABG  No results for input(s): "PH", "PO2", "PCO2", "HCO3", "BE" in the last 168 hours.    LAST 7 DAYS MICROBIOLOGY   Microbiology Results (last 7 days)       ** No results found for the last 168 hours. **            MOST RECENT IMAGING  Cardiac catheterization    The estimated blood loss was <50 mL.    The pre-procedure left ventricular end diastolic pressure was 11.    The Mid LAD lesion was 100% stenosed with 10% stenosis   post-intervention.    Mid LAD lesion: A STENT SYNERGY XD 3.0X32MM stent was successfully   placed at 11 MALGORZATA for 15 sec. stents post dilated with 3.75 NC    Mid LAD lesion: A STENT SYNERGY XD 4.0X28MM stent was successfully   placed at 11 MALGORZATA for 15 sec. stent post dilated with 4 5 NC proximally.    Culprit lesion was mid %, successfully treated with IVUS guided   PCI with 2 overlapping drug-eluting stents.    Mild diffuse disease of ramus, left circumflex and right coronary   artery.    The procedure log was documented by Documenter: Gregoria, " ROSIBEL Mcintyre and   verified by Javon Sharma MD.    Date: 4/18/2025  Time: 8:25 AM      ECHOCARDIOGRAM RESULTS (last 5)  Results for orders placed during the hospital encounter of 04/16/25    Echo    Interpretation Summary    Left Ventricle: Left ventricle was not well visualized due to poor sonic window. The left ventricle is normal in size. Moderately increased wall thickness. There is concentric hypertrophy. There is normal systolic function with a visually estimated ejection fraction of 55 - 60%.    Pericardium: There is no pericardial effusion.    Limited for effusion only.      Echo    Interpretation Summary    Left Ventricle: Left ventricle was not well visualized due to poor sonic window. The left ventricle is normal in size. Moderately increased wall thickness. There is concentric hypertrophy. There is mildly reduced systolic function with a visually estimated ejection fraction of 45 - 50%. Grade I diastolic dysfunction. E/A ratio is 0.52.    Right Ventricle: The right ventricle is normal in size. Systolic function is normal.    Aortic Valve: The aortic valve is a trileaflet valve. There is mild aortic valve sclerosis.    Tricuspid Valve: There is mild regurgitation. The estimated PA systolic pressure is at least 22 mmHg.    Pulmonic Valve: Not well visualized due to poor acoustic window.      CURRENT/PREVIOUS VISIT EKG  Results for orders placed or performed during the hospital encounter of 04/16/25   EKG 12-lead    Collection Time: 04/17/25 12:34 PM   Result Value Ref Range    QRS Duration 68 ms    OHS QTC Calculation 449 ms    Narrative    Test Reason : I21.4,    Vent. Rate :  84 BPM     Atrial Rate :  84 BPM     P-R Int : 136 ms          QRS Dur :  68 ms      QT Int : 380 ms       P-R-T Axes :  69  50 119 degrees    QTcB Int : 449 ms    Normal sinus rhythm  Possible Left atrial enlargement  T wave abnormality, consider lateral ischemia  Abnormal ECG  When compared with ECG of 17-Apr-2025  11:14,  Premature atrial complexes are no longer Present  Confirmed by Dandre Florence ([id number]) on 4/17/2025 9:53:39 PM    Referred By: AAAREFERRAL SELF           Confirmed By: Dandre Florence           ASSESSMENT/PLAN:     Active Hospital Problems    Diagnosis    *NSTEMI (non-ST elevated myocardial infarction)    Hypertension    Type 2 diabetes mellitus    History of gastric bypass       ASSESSMENT & PLAN:     NSTEMI  CAD s/p PCI to LAD  HTN  Type II DM  Hx of gastric bypass  Morbid obesity       RECOMMENDATIONS:    Increase carvedilol to 12.5 mg p.o. b.i.d. and continue losartan 50 mg p.o. daily.  Monitor blood pressure at home and follow-up outpatient for further adjustment in antihypertensive regimen.  Continue atorvastatin 80 mg p.o. daily, aspirin 81 mg p.o. daily, and Plavix 75 mg p.o. daily.  Reiterated the importance of not missing his aspirin and Plavix on a daily basis.  Okay to discharge from cardiac standpoint and follow-up outpatient.  Recommend cardiac rehab.  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  Ideally he may need to take some time off in order to do cardiac rehab and allow his body to heal.          Charleen Kumar NP  Date of Service: 04/19/2025  10:42 AM       [1]   Social History  Tobacco Use    Smoking status: Never   Substance Use Topics    Alcohol use: No    Drug use: No   [2]   No current facility-administered medications on file prior to encounter.     Current Outpatient Medications on File Prior to Encounter   Medication Sig Dispense Refill    CIALIS 20 mg Tab Take 20 mg by mouth daily as needed (ED).      VITAMIN D3 10 mcg (400 unit) tablet Take 1 tablet by mouth once daily.      [DISCONTINUED] budesonide-formoterol 80-4.5 mcg (SYMBICORT) 80-4.5 mcg/actuation HFAA Symbicort 80-4.5 MCG/ACT Inhalation Aerosol QTY: 10.2 gram Days: 30 Refills: 1  Written: 02/17/23 Patient Instructions:  inhale 2 puff twice a day (Patient not taking: Reported on 4/16/2025)

## 2025-04-19 NOTE — RESPIRATORY THERAPY
04/18/25 2050   Patient Assessment/Suction   Level of Consciousness (AVPU) alert   Respiratory Effort Normal;Unlabored   Expansion/Accessory Muscles/Retractions no retractions;no use of accessory muscles   All Lung Fields Breath Sounds clear   Rhythm/Pattern, Respiratory pattern regular;unlabored   Cough Frequency no cough   PRE-TX-O2   Device (Oxygen Therapy) room air   SpO2 96 %   Pulse Oximetry Type Continuous   $ Pulse Oximetry - Multiple Charge Pulse Oximetry - Multiple   Pulse 83   Resp (!) 25   Aerosol Therapy   $ Aerosol Therapy Charges PRN treatment not required   Education   $ Education Bronchodilator;15 min

## 2025-04-19 NOTE — CARE UPDATE
04/19/25 0816   Patient Assessment/Suction   Level of Consciousness (AVPU) alert   Respiratory Effort Normal;Unlabored   Expansion/Accessory Muscles/Retractions no use of accessory muscles   All Lung Fields Breath Sounds clear   Rhythm/Pattern, Respiratory unlabored   Cough Frequency no cough   PRE-TX-O2   Device (Oxygen Therapy) room air   SpO2 95 %   Pulse Oximetry Type Continuous   $ Pulse Oximetry - Multiple Charge Pulse Oximetry - Multiple   Pulse 75   Resp 10   Aerosol Therapy   $ Aerosol Therapy Charges PRN treatment not required   Respiratory Treatment Status (SVN) PRN treatment not required   Education   $ Education Other (see comment);15 min  (sats)

## 2025-04-19 NOTE — DISCHARGE SUMMARY
[facility] Medicine  Discharge Summary      Patient Name: Janae Gill  MRN: [id number]  YUSUF: [id number]  Patient Class: IP- Inpatient  Admission Date: 4/16/2025  Hospital Length of Stay: 3 days  Discharge Date and Time: 04/19/2025 2:05 PM  Attending Physician: Mario Grayson DO   Discharging Provider: Mario Grayson DO  Primary Care Provider: Juany Malik MD    Primary Care Team: [place] [id number] A MD    HPI:   49 year old male with comorbid conditions of obesity, h/o gastric bypass, type 2 diabete (off medications), hypertension (off medications), hyperlipidemia presented to [facility] due to intermittent chest pain for 4-5 days.  Pain is midsternal, radiating to right jaw and back between shoulder blades, worse with exertion, associated with shortness of breath.  Denies fevers, nausea/vomiting, diarrhea, diaphoresis.  Pain in ED was rated 6/10 which improved with nitroglycerin.    Patient has family history of CAD before 50 on mother's side.    Labs revealed elevated troponin to 460, EKG with normal sinus rhtyhm, nonspecific t-wave changes. Patient was started on heparin infusion and transferred to [facility] for cardiology evaluation and ICU monitoring.         Procedure(s) (LRB):  Angiogram, Coronary, with Left Heart Cath (N/A)  Percutaneous coronary intervention (N/A)  IVUS, Coronary      Hospital Course:   Patient was admitted to the hospital medicine service for NSTEMI.  Cardiology was consulted.  He underwent angiogram via right radial access which showed occluded mid LAD which was treated with PCI with 2 overlapping PAULA.  Dual antiplatelet therapy plus statin was recommended.  Coreg and losartan were added for hypertension control.  Cardiology cleared patient to be discharged after overnight observation.  Recommended to follow-up with PCP and Cardiology     Goals of Care Treatment Preferences:  Code Status: Full Code      SDOH Screening:  The patient was screened for utility difficulties, food  insecurity, transport difficulties, housing insecurity, and interpersonal safety and there were no concerns identified this admission.     Consults:   Consults (From admission, onward)          Status Ordering Provider     Inpatient consult to Registered Dietitian/Nutritionist  Once        Provider:  (Not yet assigned)    Completed AURELIANO LAIRD     Inpatient consult to Social Work/Case Management  Once        Provider:  (Not yet assigned)    Completed AURELIANO LAIRD     Inpatient consult to Cardiology  Once        Provider:  Brisa Benitez MD    Completed AURELIANO LAIRD            Assessment & Plan  NSTEMI (non-ST elevated myocardial infarction)  Patient presented with chest pain and has elevated troponin on blood work.  Troponin 482 >1802, EKG without st elevations.      Status post PCI with placement of 2 overlapping PAULA  Aspirin and Plavix plus statin    Hypertension  Patient's blood pressure range in the last 24 hours was: BP  Min: 100/60  Max: 158/101.The patient's inpatient anti-hypertensive regimen is listed below:  Current Antihypertensives  nitroGLYCERIN SL tablet 0.4 mg, Every 5 min PRN, Sublingual  labetaloL injection 10 mg, Every 6 hours PRN, Intravenous  losartan tablet 50 mg, Daily, Oral  carvediloL tablet 12.5 mg, 2 times daily with meals, Oral  carvedilol (COREG) tablet, 2 times daily with meals, Oral  losartan (COZAAR) tablet, Daily, Oral    Plan  - BP is elevated.  He has been started on losartan and Coreg per Cardiology.  - PRN labetalol   Type 2 diabetes mellitus  Patient's FSGs are controlled on current medication regimen.  Last A1c reviewed-   Lab Results   Component Value Date    HGBA1C 6.0 04/16/2025     Most recent fingerstick glucose reviewed-   Recent Labs   Lab 04/18/25  1705 04/18/25  1857 04/19/25  0744 04/19/25  1055   POCTGLUCOSE 199* 221* 121* 182*     Current correctional scale  Low  Maintain anti-hyperglycemic dose as follows-   Antihyperglycemics (From admission, onward)      Start      Stop Route Frequency Ordered    04/16/25 2350  insulin aspart U-100 pen 0-5 Units         -- SubQ Before meals & nightly PRN 04/16/25 2251          Hold Oral hypoglycemics while patient is in the hospital.  Patient reports being off antihyperglycemic medications and does not take insulin   History of gastric bypass  Noted    Final Active Diagnoses:    Diagnosis Date Noted POA    PRINCIPAL PROBLEM:  NSTEMI (non-ST elevated myocardial infarction) [I21.4] 04/16/2025 Yes    Hypertension [I10] 04/16/2025 Yes    Type 2 diabetes mellitus [E11.9] 04/16/2025 Yes    History of gastric bypass [Z98.84] 04/16/2025 Not Applicable      Problems Resolved During this Admission:    Diagnosis Date Noted Date Resolved POA    Acute on chronic renal failure [N17.9, N18.9] 04/16/2025 04/16/2025 Yes       Discharged Condition: fair    Disposition: Home or Self Care    Follow Up:   Follow-up Information       Juany Malik MD Follow up in 2 week(s).    Specialty: Family Medicine  Why: Please call the office to schedule a hospital follow up within 2 weeks  Contact information:  [facility] [facility] [place] [address]  [phone]               Javon Sharma MD Follow up on 4/28/2025.    Specialties: Interventional Cardiology, Cardiology  Why: at 9 am  Contact information:  [address] [facility]  [address]  [facility] [address]  [phone]                           Patient Instructions:      Cardiac rehab phase II   Standing Status: Future Standing Exp. Date: 04/17/26     Order Specific Question Answer Comments   Department [facility] [place]    Select qualifying diagnosis: I20.8 - Stable angina        Significant Diagnostic Studies: Labs: BMP:   Recent Labs   Lab 04/18/25  0509 04/19/25  0413    137   K 3.9 4.0   CO2 23 24   BUN 16 22*   CREATININE 1.3 1.5*   CALCIUM 8.8 9.0   , CMP   Recent Labs   Lab 04/18/25  0509 04/19/25  0413    137   K 3.9 4.0   CO2 23 24   BUN 16 22*   CREATININE 1.3 1.5*   CALCIUM 8.8 9.0    ALBUMIN 3.6 3.7   BILITOT 0.6 0.6   ALKPHOS 86 83   AST 37 24   ALT 15 14   , and CBC   Recent Labs   Lab 04/18/25  0509   WBC 7.66   HGB 14.4   HCT 41.8          Pending Diagnostic Studies:       Procedure Component Value Units Date/Time    EKG 12-lead [[id number]]     Order Status: Sent Lab Status: No result            Medications:  Reconciled Home Medications:      Medication List        START taking these medications      aspirin 81 MG Chew  Take 1 tablet (81 mg total) by mouth once daily.     atorvastatin 80 MG tablet  Commonly known as: LIPITOR  Take 1 tablet (80 mg total) by mouth once daily.     carvediloL 12.5 MG tablet  Commonly known as: COREG  Take 1 tablet (12.5 mg total) by mouth 2 (two) times daily with meals.     clopidogreL 75 mg tablet  Commonly known as: PLAVIX  Take 1 tablet (75 mg total) by mouth once daily.     losartan 50 MG tablet  Commonly known as: COZAAR  Take 1 tablet (50 mg total) by mouth once daily.  Start taking on: April 20, 2025            CONTINUE taking these medications      CIALIS 20 mg Tab  Generic drug: tadalafiL  Take 20 mg by mouth daily as needed (ED).     VITAMIN D3 10 mcg (400 unit) tablet  Generic drug: cholecalciferol (vitamin D3)  Take 1 tablet by mouth once daily.            STOP taking these medications      SYMBICORT 80-4.5 mcg/actuation Hfaa  Generic drug: budesonide-formoterol 80-4.5 mcg              Indwelling Lines/Drains at time of discharge:   Lines/Drains/Airways       None                   Time spent on the discharge of patient: 32 minutes         Mario Grayson DO  Department of Hospital Medicine  formerly [facility]

## 2025-04-19 NOTE — ASSESSMENT & PLAN NOTE
Patient's FSGs are controlled on current medication regimen.  Last A1c reviewed-   Lab Results   Component Value Date    HGBA1C 6.0 04/16/2025     Most recent fingerstick glucose reviewed-   Recent Labs   Lab 04/18/25  1705 04/18/25  1857 04/19/25  0744 04/19/25  1055   POCTGLUCOSE 199* 221* 121* 182*     Current correctional scale  Low  Maintain anti-hyperglycemic dose as follows-   Antihyperglycemics (From admission, onward)      Start     Stop Route Frequency Ordered    04/16/25 2350  insulin aspart U-100 pen 0-5 Units         -- SubQ Before meals & nightly PRN 04/16/25 2251          Hold Oral hypoglycemics while patient is in the hospital.  Patient reports being off antihyperglycemic medications and does not take insulin

## 2025-04-19 NOTE — ASSESSMENT & PLAN NOTE
Patient's blood pressure range in the last 24 hours was: BP  Min: 100/60  Max: 158/101.The patient's inpatient anti-hypertensive regimen is listed below:  Current Antihypertensives  nitroGLYCERIN SL tablet 0.4 mg, Every 5 min PRN, Sublingual  labetaloL injection 10 mg, Every 6 hours PRN, Intravenous  losartan tablet 50 mg, Daily, Oral  carvediloL tablet 12.5 mg, 2 times daily with meals, Oral  carvedilol (COREG) tablet, 2 times daily with meals, Oral  losartan (COZAAR) tablet, Daily, Oral    Plan  - BP is elevated.  He has been started on losartan and Coreg per Cardiology.  - PRN labetalol

## 2025-04-19 NOTE — PLAN OF CARE
Patient cleared for discharge from case management standpoint.    Chart and discharge orders reviewed.  Patient discharged home with no further case management needs.      04/19/25 1250   Final Note   Assessment Type Final Discharge Note   Anticipated Discharge Disposition Home   What phone number can be called within the next 1-3 days to see how you are doing after discharge? [phone]   Hospital Resources/Appts/Education Provided Appointments scheduled and added to AVS

## 2025-04-19 NOTE — PLAN OF CARE
Problem: Adult Inpatient Plan of Care  Goal: Plan of Care Review  Outcome: Met  Goal: Patient-Specific Goal (Individualized)  Outcome: Met  Goal: Absence of Hospital-Acquired Illness or Injury  Outcome: Met  Goal: Optimal Comfort and Wellbeing  Outcome: Met  Goal: Readiness for Transition of Care  Outcome: Met     Problem: Bariatric Environmental Safety  Goal: Safety Maintained with Care  Outcome: Met     Problem: Fall Injury Risk  Goal: Absence of Fall and Fall-Related Injury  Outcome: Met     Problem: Acute Coronary Syndrome  Goal: Optimal Adaptation to Illness  Outcome: Met  Goal: Absence of Cardiac-Related Pain  Outcome: Met  Goal: Normalized Cardiac Rhythm  Outcome: Met  Goal: Effective Cardiac Pump Function  Outcome: Met  Goal: Adequate Tissue Perfusion  Outcome: Met     Problem: Cardiac Catheterization (Diagnostic/Interventional)  Goal: Absence of Bleeding  Outcome: Met  Goal: Absence of Contrast-Induced Injury  Outcome: Met  Goal: Stable Heart Rate and Rhythm  Outcome: Met  Goal: Absence of Embolism Signs and Symptoms  Outcome: Met  Goal: Anesthesia/Sedation Recovery  Outcome: Met  Goal: Optimal Pain Control and Function  Outcome: Met  Goal: Absence of Vascular Access Complication  Outcome: Met     Problem: Diabetes Comorbidity  Goal: Blood Glucose Level Within Targeted Range  Outcome: Met     Problem: Wound  Goal: Optimal Coping  Outcome: Met  Goal: Optimal Functional Ability  Outcome: Met  Goal: Absence of Infection Signs and Symptoms  Outcome: Met  Goal: Improved Oral Intake  Outcome: Met  Goal: Optimal Pain Control and Function  Outcome: Met  Goal: Skin Health and Integrity  Outcome: Met  Goal: Optimal Wound Healing  Outcome: Met

## 2025-05-12 NOTE — PROGRESS NOTES
Subjective:    Patient ID:  Janae Gill is a 49 y.o. male who presents for follow-up of   Chief Complaint   Patient presents with    Hospital Follow Up     No issue stated by pt     Gastroesophageal Reflux     Mid area        HPI:      Here for  followup STEMI.  Back to work. No smoke.  HTN CONTROLLED  DM CONTROLLED SINCE GASTRIC BYPASS.  EXERCISING VIGOROUSLY BUT GAINING WEIGHT.  CAN'T SLEEP 2/2 ACHES PAINS  SNORES  Left Ventricle: Left ventricle was not well visualized due to poor sonic window. The left ventricle is normal in size. Moderately increased wall thickness. There is concentric hypertrophy. There is mildly reduced systolic function with a visually estimated ejection fraction of 45 - 50%. Grade I diastolic dysfunction. E/A ratio is 0.52.    Right Ventricle: The right ventricle is normal in size. Systolic function is normal.    Aortic Valve: The aortic valve is a trileaflet valve. There is mild aortic valve sclerosis.    Tricuspid Valve: There is mild regurgitation. The estimated PA systolic pressure is at least 22 mmHg.    Pulmonic Valve: Not well visualized due to poor acoustic window.         EKG TODAY NORMAL SINUS RHYTHM LEFT ATRIAL ENLARGEMENT T-WAVE ABNORMALITY ANTEROLATERAL ISCHEMIA  04/16/25 2245 CHOL 172 -- Final result   04/16/25 2245 TRIG 50 -- Final result   04/16/25 2245 LDLCALC 121.0 -- Final result   04/16/25 2245 CHOLHDL 23.8 -- Final result   04/16/25 2245 NONHDLCHOL 131 -- Final result   04/16/25 2245 TOTALCHOLEST 4.2 -- Final     4/16/25  49 year old male with comorbid conditions of obesity, h/o gastric bypass, type 2 diabete (off medications), hypertension (off medications), hyperlipidemia presented to [facility] due to intermittent chest pain for 4-5 days.  Pain is midsternal, radiating to right jaw and back between shoulder blades, worse with exertion, associated with shortness of breath.  Denies fevers, nausea/vomiting, diarrhea, diaphoresis.  Pain in ED was rated 6/10 which improved  with nitroglycerin.    Patient has family history of CAD before 50 on mother's side.    Labs revealed elevated troponin to 460, EKG with normal sinus rhtyhm, nonspecific t-wave changes. Patient was started on heparin infusion and transferred to [facility] for cardiology evaluation and ICU monitoring.            Procedure(s) (LRB):  Angiogram, Coronary, with Left Heart Cath (N/A)  Percutaneous coronary intervention (N/A)  IVUS, Coronary       Hospital Course:   Patient was admitted to the hospital medicine service for NSTEMI.  Cardiology was consulted.  He underwent angiogram via right radial access which showed occluded mid LAD which was treated with PCI with 2 overlapping PAULA.  Dual antiplatelet therapy plus statin was recommended.  Coreg and losartan were added for hypertension control.  Cardiology cleared patient to be discharged after overnight observation.  Recommended to follow-up with PCP and Cardiology          Review of patient's allergies indicates:   Allergen Reactions    Codeine        Past Medical History:   Diagnosis Date    Asthma     Diabetes mellitus     Hypertension     Obesity      Past Surgical History:   Procedure Laterality Date    ANGIOGRAM, CORONARY, WITH LEFT HEART CATHETERIZATION N/A 4/17/2025    Procedure: Angiogram, Coronary, with Left Heart Cath;  Surgeon: Javon Sharma MD;  Location: [facility] CATH/EP LAB;  Service: Cardiology;  Laterality: N/A;    IVUS, CORONARY  4/17/2025    Procedure: IVUS, Coronary;  Surgeon: Javon Sharma MD;  Location: [facility] CATH/EP LAB;  Service: Cardiology;;    PERCUTANEOUS CORONARY INTERVENTION, ARTERY N/A 4/17/2025    Procedure: Percutaneous coronary intervention;  Surgeon: Javon Sharma MD;  Location: [facility] CATH/EP LAB;  Service: Cardiology;  Laterality: N/A;     Social History[1]  No family history on file.     Review of Systems:   Constitution: Negative for diaphoresis and fever.   HEENT: Negative for nosebleeds.    Cardiovascular: Negative for chest pain       No  dyspnea on exertion       No leg swelling        No palpitations  Respiratory: Negative for shortness of breath and wheezing.    Hematologic/Lymphatic: Negative for bleeding problem. Does not bruise/bleed easily.   Skin: Negative for color change and rash.   Musculoskeletal: Negative for falls and myalgias.   Gastrointestinal: Negative for hematemesis and hematochezia.   Genitourinary: Negative for hematuria.   Neurological: Negative for dizziness and light-headedness.   Psychiatric/Behavioral: Negative for altered mental status and memory loss.          Objective:        Vitals:    05/13/25 1558   BP: 120/89   Pulse: 70       Lab Results   Component Value Date    WBC 7.66 04/18/2025    HGB 14.4 04/18/2025    HCT 41.8 04/18/2025     04/18/2025    CHOL 172 04/16/2025    TRIG 50 04/16/2025    HDL 41 04/16/2025    ALT 14 04/19/2025    AST 24 04/19/2025     04/19/2025    K 4.0 04/19/2025     04/19/2025    CREATININE 1.5 (H) 04/19/2025    BUN 22 (H) 04/19/2025    CO2 24 04/19/2025    INR 1.0 04/16/2025    HGBA1C 6.0 04/16/2025        ECHOCARDIOGRAM RESULTS  Results for orders placed during the hospital encounter of 04/16/25    Echo    Interpretation Summary    Left Ventricle: Left ventricle was not well visualized due to poor sonic window. The left ventricle is normal in size. Moderately increased wall thickness. There is concentric hypertrophy. There is normal systolic function with a visually estimated ejection fraction of 55 - 60%.    Pericardium: There is no pericardial effusion.    Limited for effusion only.        CURRENT/PREVIOUS VISIT EKG  Results for orders placed or performed during the hospital encounter of 04/16/25   EKG 12-lead    Collection Time: 04/17/25 12:34 PM   Result Value Ref Range    QRS Duration 68 ms    [facility] QTC Calculation 449 ms    Narrative    Test Reason : I21.4,    Vent. Rate :  84 BPM     Atrial Rate :  84 BPM     P-R Int : 136 ms          QRS Dur :  68 ms      QT Int : 380  ms       P-R-T Axes :  69  50 119 degrees    QTcB Int : 449 ms    Normal sinus rhythm  Possible Left atrial enlargement  T wave abnormality, consider lateral ischemia  Abnormal ECG  When compared with ECG of 17-Apr-2025 11:14,  Premature atrial complexes are no longer Present  Confirmed by Dandre Florence ([id number]) on 4/17/2025 9:53:39 PM    Referred By: AAAREFERRAL SELF           Confirmed By: Dandre Florence     No valid procedures specified.   No results found for this or any previous visit.      Physical Exam:  CONSTITUTIONAL: No fever, no chills  HEENT: Normocephalic, atraumatic,pupils reactive to light                 NECK:  No JVD no carotid bruit  CVS: S1S2+, RRR, no murmurs,   LUNGS: Clear  ABDOMEN: Soft, NT, BS+  EXTREMITIES: No cyanosis, edema  : No carreno catheter  NEURO: AAO X 3  PSY: Normal affect      Medication List with Changes/Refills   New Medications    EMPAGLIFLOZIN (JARDIANCE) 10 MG TABLET    Take 1 tablet (10 mg total) by mouth once daily.    PANTOPRAZOLE (PROTONIX) 40 MG TABLET    Take 1 tablet (40 mg total) by mouth once daily.   Current Medications    ASPIRIN 81 MG CHEW    Take 1 tablet (81 mg total) by mouth once daily.    ATORVASTATIN (LIPITOR) 80 MG TABLET    Take 1 tablet (80 mg total) by mouth once daily.    CARVEDILOL (COREG) 12.5 MG TABLET    Take 1 tablet (12.5 mg total) by mouth 2 (two) times daily with meals.    CIALIS 20 MG TAB    Take 20 mg by mouth daily as needed (ED).    CLOPIDOGREL (PLAVIX) 75 MG TABLET    Take 1 tablet (75 mg total) by mouth once daily.    EZETIMIBE (ZETIA) 10 MG TABLET    Take 10 mg by mouth once daily.    LOSARTAN (COZAAR) 50 MG TABLET    Take 1 tablet (50 mg total) by mouth once daily.    VITAMIN D3 10 MCG (400 UNIT) TABLET    Take 1 tablet by mouth once daily.             Assessment:       1. NSTEMI (non-ST elevated myocardial infarction)    2. Hypertension, unspecified type    3. Type 2 diabetes mellitus with stage 3 chronic kidney disease, without  long-term current use of insulin, unspecified whether stage 3a or 3b CKD    4. Diastolic dysfunction    5. Morbid obesity         Plan:     Problem List Items Addressed This Visit          Cardiac/Vascular    NSTEMI (non-ST elevated myocardial infarction) - Primary    Relevant Orders    IN OFFICE EKG 12-LEAD (to [place])    Hypertension       Endocrine    Type 2 diabetes mellitus     Other Visit Diagnoses         Diastolic dysfunction          Morbid obesity              HLD HAS BLOOD WORK ORDERED    S.P MI STENT LAD DOING REHAB AT [facility]    OBESITY REC WT LOSS    DM CONTROLLED CONSIDER JARDIANCE    GERD PPI TRIAL    No follow-ups on file.    The patients questions were answered, they verbalized understanding, and agreed with the treatment plan.     ABBEY HERNANDEZ MD  [facility]         [1]   Social History  Tobacco Use    Smoking status: Never   Substance Use Topics    Alcohol use: No    Drug use: No

## 2025-05-13 ENCOUNTER — OFFICE VISIT (OUTPATIENT)
Dept: CARDIOLOGY | Facility: CLINIC | Age: 50
End: 2025-05-13
Payer: MEDICAID

## 2025-05-13 VITALS
HEIGHT: 68 IN | HEART RATE: 70 BPM | OXYGEN SATURATION: 97 % | BODY MASS INDEX: 39.76 KG/M2 | DIASTOLIC BLOOD PRESSURE: 89 MMHG | SYSTOLIC BLOOD PRESSURE: 120 MMHG | WEIGHT: 262.38 LBS

## 2025-05-13 DIAGNOSIS — I51.89 DIASTOLIC DYSFUNCTION: ICD-10-CM

## 2025-05-13 DIAGNOSIS — I10 HYPERTENSION, UNSPECIFIED TYPE: ICD-10-CM

## 2025-05-13 DIAGNOSIS — N18.30 TYPE 2 DIABETES MELLITUS WITH STAGE 3 CHRONIC KIDNEY DISEASE, WITHOUT LONG-TERM CURRENT USE OF INSULIN, UNSPECIFIED WHETHER STAGE 3A OR 3B CKD: ICD-10-CM

## 2025-05-13 DIAGNOSIS — E66.01 MORBID OBESITY: ICD-10-CM

## 2025-05-13 DIAGNOSIS — I21.4 NSTEMI (NON-ST ELEVATED MYOCARDIAL INFARCTION): Primary | ICD-10-CM

## 2025-05-13 DIAGNOSIS — E11.22 TYPE 2 DIABETES MELLITUS WITH STAGE 3 CHRONIC KIDNEY DISEASE, WITHOUT LONG-TERM CURRENT USE OF INSULIN, UNSPECIFIED WHETHER STAGE 3A OR 3B CKD: ICD-10-CM

## 2025-05-13 LAB
OHS QRS DURATION: 70 MS
OHS QTC CALCULATION: 448 MS

## 2025-05-13 PROCEDURE — 99213 OFFICE O/P EST LOW 20 MIN: CPT | Mod: PBBFAC,PN | Performed by: GENERAL PRACTICE

## 2025-05-13 PROCEDURE — 3044F HG A1C LEVEL LT 7.0%: CPT | Mod: CPTII,,, | Performed by: GENERAL PRACTICE

## 2025-05-13 PROCEDURE — 3074F SYST BP LT 130 MM HG: CPT | Mod: CPTII,,, | Performed by: GENERAL PRACTICE

## 2025-05-13 PROCEDURE — 3079F DIAST BP 80-89 MM HG: CPT | Mod: CPTII,,, | Performed by: GENERAL PRACTICE

## 2025-05-13 PROCEDURE — 4010F ACE/ARB THERAPY RXD/TAKEN: CPT | Mod: CPTII,,, | Performed by: GENERAL PRACTICE

## 2025-05-13 PROCEDURE — 99999 PR PBB SHADOW E&M-EST. PATIENT-LVL III: CPT | Mod: PBBFAC,,, | Performed by: GENERAL PRACTICE

## 2025-05-13 PROCEDURE — 1111F DSCHRG MED/CURRENT MED MERGE: CPT | Mod: CPTII,,, | Performed by: GENERAL PRACTICE

## 2025-05-13 PROCEDURE — 1159F MED LIST DOCD IN RCRD: CPT | Mod: CPTII,,, | Performed by: GENERAL PRACTICE

## 2025-05-13 PROCEDURE — 99214 OFFICE O/P EST MOD 30 MIN: CPT | Mod: S$PBB,,, | Performed by: GENERAL PRACTICE

## 2025-05-13 PROCEDURE — 3008F BODY MASS INDEX DOCD: CPT | Mod: CPTII,,, | Performed by: GENERAL PRACTICE

## 2025-05-13 RX ORDER — EZETIMIBE 10 MG/1
10 TABLET ORAL DAILY
COMMUNITY

## 2025-05-13 RX ORDER — PANTOPRAZOLE SODIUM 40 MG/1
40 TABLET, DELAYED RELEASE ORAL DAILY
Qty: 90 TABLET | Refills: 3 | Status: SHIPPED | OUTPATIENT
Start: 2025-05-13 | End: 2026-05-13

## 2025-06-22 ENCOUNTER — HOSPITAL ENCOUNTER (EMERGENCY)
Facility: HOSPITAL | Age: 50
Discharge: HOME | End: 2025-06-23
Attending: STUDENT IN AN ORGANIZED HEALTH CARE EDUCATION/TRAINING PROGRAM
Payer: MEDICAID

## 2025-06-22 DIAGNOSIS — T78.2XXA ANAPHYLAXIS, INITIAL ENCOUNTER: Primary | ICD-10-CM

## 2025-06-22 DIAGNOSIS — R07.9 CHEST PAIN: ICD-10-CM

## 2025-06-22 LAB
ABSOLUTE EOSINOPHIL (SMH): 0.21 K/UL
ABSOLUTE MONOCYTE (SMH): 0.34 K/UL (ref 0.3–1)
ABSOLUTE NEUTROPHIL COUNT (SMH): 4.4 K/UL (ref 1.8–7.7)
ALBUMIN SERPL-MCNC: 3.8 G/DL (ref 3.5–5.2)
ALP SERPL-CCNC: 93 UNIT/L (ref 55–135)
ALT SERPL-CCNC: 20 UNIT/L (ref 10–44)
ANION GAP (SMH): 8 MMOL/L (ref 8–16)
AST SERPL-CCNC: 21 UNIT/L (ref 10–40)
BASOPHILS # BLD AUTO: 0.01 K/UL
BASOPHILS NFR BLD AUTO: 0.1 %
BILIRUB SERPL-MCNC: 0.7 MG/DL (ref 0.1–1)
BNP SERPL-MCNC: 206 PG/ML
BUN SERPL-MCNC: 18 MG/DL (ref 6–20)
CALCIUM SERPL-MCNC: 8.9 MG/DL (ref 8.7–10.5)
CHLORIDE SERPL-SCNC: 111 MMOL/L (ref 95–110)
CO2 SERPL-SCNC: 22 MMOL/L (ref 23–29)
CREAT SERPL-MCNC: 1.4 MG/DL (ref 0.5–1.4)
ERYTHROCYTE [DISTWIDTH] IN BLOOD BY AUTOMATED COUNT: 13 % (ref 11.5–14.5)
GFR SERPLBLD CREATININE-BSD FMLA CKD-EPI: >60 ML/MIN/1.73/M2
GLUCOSE SERPL-MCNC: 222 MG/DL (ref 70–110)
HCT VFR BLD AUTO: 49.5 % (ref 40–54)
HGB BLD-MCNC: 16.2 GM/DL (ref 14–18)
IMM GRANULOCYTES # BLD AUTO: 0.03 K/UL (ref 0–0.04)
IMM GRANULOCYTES NFR BLD AUTO: 0.4 % (ref 0–0.5)
LYMPHOCYTES # BLD AUTO: 2.31 K/UL (ref 1–4.8)
MAGNESIUM SERPL-MCNC: 2 MG/DL (ref 1.6–2.6)
MCH RBC QN AUTO: 30.2 PG (ref 27–31)
MCHC RBC AUTO-ENTMCNC: 32.7 G/DL (ref 32–36)
MCV RBC AUTO: 92 FL (ref 82–98)
NUCLEATED RBC (/100WBC) (SMH): 0 /100 WBC
PLATELET # BLD AUTO: 262 K/UL (ref 150–450)
PMV BLD AUTO: 10.7 FL (ref 9.2–12.9)
POTASSIUM SERPL-SCNC: 4.2 MMOL/L (ref 3.5–5.1)
PROT SERPL-MCNC: 7.3 GM/DL (ref 6–8.4)
RBC # BLD AUTO: 5.37 M/UL (ref 4.6–6.2)
RELATIVE EOSINOPHIL (SMH): 2.9 % (ref 0–8)
RELATIVE LYMPHOCYTE (SMH): 31.8 % (ref 18–48)
RELATIVE MONOCYTE (SMH): 4.7 % (ref 4–15)
RELATIVE NEUTROPHIL (SMH): 60.1 % (ref 38–73)
SODIUM SERPL-SCNC: 141 MMOL/L (ref 136–145)
TROPONIN HIGH SENSITIVE (SMH): 12.7 PG/ML
WBC # BLD AUTO: 7.27 K/UL (ref 3.9–12.7)

## 2025-06-22 PROCEDURE — 94644 CONT INHLJ TX 1ST HOUR: CPT

## 2025-06-22 PROCEDURE — 25000242 PHARM REV CODE 250 ALT 637 W/ HCPCS: Performed by: STUDENT IN AN ORGANIZED HEALTH CARE EDUCATION/TRAINING PROGRAM

## 2025-06-22 PROCEDURE — 99285 EMERGENCY DEPT VISIT HI MDM: CPT | Mod: 25

## 2025-06-22 PROCEDURE — 85025 COMPLETE CBC W/AUTO DIFF WBC: CPT | Performed by: STUDENT IN AN ORGANIZED HEALTH CARE EDUCATION/TRAINING PROGRAM

## 2025-06-22 PROCEDURE — 96372 THER/PROPH/DIAG INJ SC/IM: CPT | Performed by: STUDENT IN AN ORGANIZED HEALTH CARE EDUCATION/TRAINING PROGRAM

## 2025-06-22 PROCEDURE — 99900031 HC PATIENT EDUCATION (STAT)

## 2025-06-22 PROCEDURE — 96374 THER/PROPH/DIAG INJ IV PUSH: CPT

## 2025-06-22 PROCEDURE — 93010 ELECTROCARDIOGRAM REPORT: CPT | Mod: ,,, | Performed by: INTERNAL MEDICINE

## 2025-06-22 PROCEDURE — 83880 ASSAY OF NATRIURETIC PEPTIDE: CPT | Performed by: STUDENT IN AN ORGANIZED HEALTH CARE EDUCATION/TRAINING PROGRAM

## 2025-06-22 PROCEDURE — 25000003 PHARM REV CODE 250: Performed by: STUDENT IN AN ORGANIZED HEALTH CARE EDUCATION/TRAINING PROGRAM

## 2025-06-22 PROCEDURE — 96375 TX/PRO/DX INJ NEW DRUG ADDON: CPT

## 2025-06-22 PROCEDURE — 83735 ASSAY OF MAGNESIUM: CPT | Performed by: STUDENT IN AN ORGANIZED HEALTH CARE EDUCATION/TRAINING PROGRAM

## 2025-06-22 PROCEDURE — 94761 N-INVAS EAR/PLS OXIMETRY MLT: CPT

## 2025-06-22 PROCEDURE — 80053 COMPREHEN METABOLIC PANEL: CPT | Performed by: STUDENT IN AN ORGANIZED HEALTH CARE EDUCATION/TRAINING PROGRAM

## 2025-06-22 PROCEDURE — 93005 ELECTROCARDIOGRAM TRACING: CPT | Performed by: INTERNAL MEDICINE

## 2025-06-22 PROCEDURE — 63600175 PHARM REV CODE 636 W HCPCS: Performed by: STUDENT IN AN ORGANIZED HEALTH CARE EDUCATION/TRAINING PROGRAM

## 2025-06-22 PROCEDURE — 84484 ASSAY OF TROPONIN QUANT: CPT | Performed by: STUDENT IN AN ORGANIZED HEALTH CARE EDUCATION/TRAINING PROGRAM

## 2025-06-22 RX ORDER — IPRATROPIUM BROMIDE AND ALBUTEROL SULFATE 2.5; .5 MG/3ML; MG/3ML
3 SOLUTION RESPIRATORY (INHALATION)
Status: COMPLETED | OUTPATIENT
Start: 2025-06-22 | End: 2025-06-22

## 2025-06-22 RX ORDER — DIPHENHYDRAMINE HYDROCHLORIDE 50 MG/ML
25 INJECTION, SOLUTION INTRAMUSCULAR; INTRAVENOUS
Status: COMPLETED | OUTPATIENT
Start: 2025-06-22 | End: 2025-06-22

## 2025-06-22 RX ORDER — ONDANSETRON HYDROCHLORIDE 2 MG/ML
4 INJECTION, SOLUTION INTRAVENOUS
Status: DISCONTINUED | OUTPATIENT
Start: 2025-06-22 | End: 2025-06-23 | Stop reason: HOSPADM

## 2025-06-22 RX ORDER — ONDANSETRON HYDROCHLORIDE 4 MG/5ML
2 SOLUTION ORAL
Status: DISCONTINUED | OUTPATIENT
Start: 2025-06-22 | End: 2025-06-22

## 2025-06-22 RX ORDER — EPINEPHRINE 0.3 MG/.3ML
0.3 INJECTION SUBCUTANEOUS
Status: COMPLETED | OUTPATIENT
Start: 2025-06-22 | End: 2025-06-22

## 2025-06-22 RX ORDER — METHYLPREDNISOLONE SOD SUCC 125 MG
125 VIAL (EA) INJECTION
Status: COMPLETED | OUTPATIENT
Start: 2025-06-22 | End: 2025-06-22

## 2025-06-22 RX ORDER — FAMOTIDINE 10 MG/ML
20 INJECTION, SOLUTION INTRAVENOUS
Status: COMPLETED | OUTPATIENT
Start: 2025-06-22 | End: 2025-06-22

## 2025-06-22 RX ADMIN — FAMOTIDINE 20 MG: 10 INJECTION, SOLUTION INTRAVENOUS at 09:06

## 2025-06-22 RX ADMIN — IPRATROPIUM BROMIDE AND ALBUTEROL SULFATE 3 ML: 2.5; .5 SOLUTION RESPIRATORY (INHALATION) at 09:06

## 2025-06-22 RX ADMIN — EPINEPHRINE 0.3 MG: 0.3 INJECTION INTRAMUSCULAR at 09:06

## 2025-06-22 RX ADMIN — DIPHENHYDRAMINE HYDROCHLORIDE 25 MG: 50 INJECTION, SOLUTION INTRAMUSCULAR; INTRAVENOUS at 09:06

## 2025-06-22 RX ADMIN — METHYLPREDNISOLONE SODIUM SUCCINATE 125 MG: 125 INJECTION, POWDER, FOR SOLUTION INTRAMUSCULAR; INTRAVENOUS at 09:06

## 2025-06-23 VITALS
SYSTOLIC BLOOD PRESSURE: 178 MMHG | DIASTOLIC BLOOD PRESSURE: 90 MMHG | BODY MASS INDEX: 39.25 KG/M2 | HEIGHT: 68 IN | RESPIRATION RATE: 17 BRPM | TEMPERATURE: 99 F | HEART RATE: 79 BPM | OXYGEN SATURATION: 96 % | WEIGHT: 259 LBS

## 2025-06-23 LAB — TROPONIN HIGH SENSITIVE (SMH): 14.7 PG/ML

## 2025-06-23 RX ORDER — PREDNISONE 20 MG/1
40 TABLET ORAL DAILY
Qty: 8 TABLET | Refills: 0 | Status: SHIPPED | OUTPATIENT
Start: 2025-06-23 | End: 2025-06-27

## 2025-06-23 RX ORDER — EPINEPHRINE 0.3 MG/.3ML
1 INJECTION SUBCUTANEOUS
Qty: 1 EACH | Refills: 3 | Status: SHIPPED | OUTPATIENT
Start: 2025-06-23 | End: 2026-06-23

## 2025-06-23 NOTE — DISCHARGE INSTRUCTIONS
You were seen for anaphylaxis.  Use your EpiPen as needed if the symptoms return.  Take your steroids for the next 4 days.  I referred you to the allergy doctor.  You can take Benadryl and famotidine as well.    Please return to this facility or another ED as needed for any new or worsening symptoms including chest pain, shortness of breath, abdominal pain, nausea or vomiting, fever or chills. Please follow up with your primary care doctor in 3 days. Please take all medicines as prescribed.

## 2025-06-23 NOTE — ED PROVIDER NOTES
Encounter Date: 6/22/2025       History     Chief Complaint   Patient presents with    Allergic Reaction     Patient stated ate shrimp today with noted hives and reports difficulty breathing      HPI    49-year-old man with a history of CAD with recent PCI presents for evaluation of allergic reaction.  He said it started about an hour ago.  No obvious new stimuli.  He said he ate some shrimp but he has eaten treatment he times before without difficulty.  He has a history of childhood asthma.  He has not never had anaphylaxis before.  He is complaining of hives shortness of breath and wheezing.  He has all-over chest tightness he said this feels very different than his CAD.  He said he tried to give himself an EpiPen at home but it did not work.  He took some Benadryl at home.  He denies dizziness or lightheadedness.  He denies GI symptoms  Review of patient's allergies indicates:   Allergen Reactions    Codeine      Past Medical History:   Diagnosis Date    Asthma     Diabetes mellitus     Hypertension     Obesity      Past Surgical History:   Procedure Laterality Date    ANGIOGRAM, CORONARY, WITH LEFT HEART CATHETERIZATION N/A 4/17/2025    Procedure: Angiogram, Coronary, with Left Heart Cath;  Surgeon: Javon Sharma MD;  Location: [facility] CATH/EP LAB;  Service: Cardiology;  Laterality: N/A;    IVUS, CORONARY  4/17/2025    Procedure: IVUS, Coronary;  Surgeon: Javon Sharma MD;  Location: [facility] CATH/EP LAB;  Service: Cardiology;;    PERCUTANEOUS CORONARY INTERVENTION, ARTERY N/A 4/17/2025    Procedure: Percutaneous coronary intervention;  Surgeon: Javon Sharma MD;  Location: [facility] CATH/EP LAB;  Service: Cardiology;  Laterality: N/A;     No family history on file.  Social History[1]  Review of Systems   All other systems reviewed and are negative.      Physical Exam     Initial Vitals [06/22/25 2057]   BP Pulse Resp Temp SpO2   (!) 152/101 95 20 98.4 °F (36.9 °C) 95 %      MAP       --         Physical  Exam    Nursing note and vitals reviewed.  Constitutional: He appears well-developed and well-nourished.   HENT:   Head: Normocephalic and atraumatic.   Eyes: Right eye exhibits no discharge. Left eye exhibits no discharge.   Neck: No tracheal deviation present.   Cardiovascular:  Normal rate, regular rhythm and intact distal pulses.           Pulmonary/Chest: No stridor. No respiratory distress.   Normal work of breathing speaking in full sentences diffuse expiratory wheezes   Abdominal: Abdomen is soft. Bowel sounds are normal. There is no abdominal tenderness. There is no rebound.   Musculoskeletal:         General: No edema.     Neurological: He is alert and oriented to person, place, and time.   Skin: Skin is warm and dry.   Diffuse urticarial rash         ED Course   Critical Care    Date/Time: 6/23/2025 3:10 AM    Performed by: Alfredo Mars MD  Authorized by: Alfredo Mars MD  Direct patient critical care time: 20 minutes  Ordering / reviewing critical care time: 5 minutes  Documentation critical care time: 6 minutes  Total critical care time (exclusive of procedural time) : 31 minutes  Critical care time was exclusive of separately billable procedures and treating other patients and teaching time.  Critical care was necessary to treat or prevent imminent or life-threatening deterioration of the following conditions: shock.  Critical care was time spent personally by me on the following activities: development of treatment plan with patient or surrogate, interpretation of cardiac output measurements, evaluation of patient's response to treatment, examination of patient, obtaining history from patient or surrogate, ordering and performing treatments and interventions, ordering and review of laboratory studies, ordering and review of radiographic studies, pulse oximetry, re-evaluation of patient's condition and review of old charts.        Labs Reviewed   COMPREHENSIVE METABOLIC PANEL -  Abnormal       Result Value    Sodium 141      Potassium 4.2      Chloride 111 (*)     CO2 22 (*)     Glucose 222 (*)     BUN 18      Creatinine 1.4      Calcium 8.9      Protein Total 7.3      Albumin 3.8      Bilirubin Total 0.7      ALP 93      AST 21      ALT 20      Anion Gap 8      eGFR >60     B-TYPE NATRIURETIC PEPTIDE - Abnormal     (*)    MAGNESIUM - Normal    Magnesium 2.0     TROPONIN I HIGH SENSITIVITY - Normal    Troponin High Sensitive 12.7     CBC WITH DIFFERENTIAL - Normal    WBC 7.27      RBC 5.37      Hgb 16.2      Hct 49.5      MCV 92      MCH 30.2      MCHC 32.7      RDW 13.0      Platelet Count 262      MPV 10.7      Nucleated RBC 0      Neut % 60.1      Lymph % 31.8      Mono % 4.7      Eos % 2.9      Basophil % 0.1      Imm Grans % 0.4      Neut # 4.4      Lymph # 2.31      Mono # 0.34      Eos # 0.21      Baso # 0.01      Imm Grans # 0.03     TROPONIN I HIGH SENSITIVITY - Normal    Troponin High Sensitive 14.7     CBC W/ AUTO DIFFERENTIAL    Narrative:     The following orders were created for panel order CBC auto differential.  Procedure                               Abnormality         Status                     ---------                               -----------         ------                     CBC with Differential[[id number]]       Normal              Final result                 Please view results for these tests on the individual orders.          Imaging Results              X-Ray Chest AP Portable (Final result)  Result time 06/22/25 22:13:35      Final result by Mark Benoit MD (06/22/25 22:13:35)                   Impression:      No acute findings in the chest.      Electronically signed by: Mark Benoit MD  Date:    06/22/2025  Time:    22:13               Narrative:    EXAMINATION:  XR CHEST AP PORTABLE    CLINICAL HISTORY:  Chest Pain;    TECHNIQUE:  Single frontal view of the chest was performed.    COMPARISON:  04/16/2025.    FINDINGS:  No consolidation,  pleural effusion or pneumothorax.    Cardiomediastinal silhouette is unremarkable.                                       Medications   ondansetron injection 4 mg (4 mg Intravenous Not Given 6/22/25 2200)   EPINEPHrine (EPIPEN) 0.3 mg/0.3 mL pen injection 0.3 mg (0.3 mg Intramuscular Given 6/22/25 2149)   diphenhydrAMINE injection 25 mg (25 mg Intravenous Given 6/22/25 2149)   methylPREDNISolone sodium succinate injection 125 mg (125 mg Intravenous Given 6/22/25 2149)   famotidine (PF) injection 20 mg (20 mg Intravenous Given 6/22/25 2150)   albuterol-ipratropium 2.5 mg-0.5 mg/3 mL nebulizer solution 3 mL (3 mLs Nebulization Given 6/22/25 2154)     Medical Decision Making  Concern for allergic reaction, vitals hypertensive systolic 150s, on exam he has a diffuse urticarial rash, he has wheezes, he is very well-appearing.  He meets criteria for anaphylaxis.  With his history of recent CAD and procedure and chest pain I will get cardiac workup as well.    Troponin negative x2    He felt much better he wanted to go home, no recurrent anaphylaxis, improved breath sounds    Amount and/or Complexity of Data Reviewed  Labs: ordered.  Radiology: ordered and independent interpretation performed. Decision-making details documented in ED Course.  ECG/medicine tests: ordered and independent interpretation performed. Decision-making details documented in ED Course.    Risk  Prescription drug management.               ED Course as of 06/23/25 0310   [place] Jun 22, 2025 2115 Symptoms consistent with anaphylaxis we will give him an EpiPen and DuoNeb as well as steroids H2 H1 blocker [IC]   2120 EKG on my independent interpretation normal sinus rhythm normal axis no STEMI QTC less than 500 [IC]   2255 I re-evaluated this patient his breath sounds have improved he said he feels much better, he is resting comfortably in bed. [IC]   2310 Chest x-ray without focal consolidation my independent interpretation [IC]   Mon Jun 23, 2025   0145  He is ready to go home, Return precautions/follow up instructions/treatment plan given, expressed agreement and understanding.    [IC]      ED Course User Index  [IC] Alfredo Mars MD                           Clinical Impression:  Final diagnoses:  [R07.9] Chest pain  [T78.2XXA] Anaphylaxis, initial encounter (Primary)          ED Disposition Condition    Discharge           ED Prescriptions       Medication Sig Dispense Start Date End Date Auth. Provider    EPINEPHrine (EPIPEN) 0.3 mg/0.3 mL AtIn Inject 0.3 mLs (0.3 mg total) into the muscle as needed (anaphylaxis). 1 each 6/23/2025 6/23/2026 Alfredo Mars MD    predniSONE (DELTASONE) 20 MG tablet Take 2 tablets (40 mg total) by mouth once daily. for 4 days 8 tablet 6/23/2025 6/27/2025 Alfredo Mars MD          Follow-up Information       Follow up With Specialties Details Why Contact Info Additional Information    Juany Malik MD Family Medicine Go to  As needed, If symptoms worsen [address] [facility] [address]  [phone]       formerly [facility] - Emergency Dept Emergency Medicine Go to  As needed, If symptoms worsen [address] [facility] [address] [phone] 1st floor                   [1]   Social History  Tobacco Use    Smoking status: Never   Substance Use Topics    Alcohol use: No    Drug use: No        Alfredo Mars MD  06/23/25 9015

## 2025-06-27 LAB
OHS QRS DURATION: 66 MS
OHS QTC CALCULATION: 447 MS

## (undated) DEVICE — CATH DXTERITY JR40 100CM 5FR

## (undated) DEVICE — VISIPAQUE CONTRAST 320MG/100ML

## (undated) DEVICE — HEMOSTAT VASC BAND REG 24CM

## (undated) DEVICE — GUIDE WIRE BMW 014 X190

## (undated) DEVICE — CATH NC EMERGE MR 4.50X12MM

## (undated) DEVICE — WIRE GUIDE HI TRQ BAL

## (undated) DEVICE — CATH EAGLE EYE PLATINUM

## (undated) DEVICE — CATH NC EMERGE MR 4X15MM

## (undated) DEVICE — KIT GLIDESHEATH SLEND 6FR 10CM

## (undated) DEVICE — CATH NC EMERGE MR 3.75X15X143

## (undated) DEVICE — CATH DXTERITY JL40 100CM 5FR

## (undated) DEVICE — GUIDEWIRE RUNTHROUGH EF 180CM

## (undated) DEVICE — GUIDEWIRE INQWIRE SE 3MM JTIP

## (undated) DEVICE — CATH EMERGE MR 15 X 2.00

## (undated) DEVICE — GUIDE LAUNCHER 6FR EBU 3.5